# Patient Record
Sex: MALE | Employment: OTHER | URBAN - METROPOLITAN AREA
[De-identification: names, ages, dates, MRNs, and addresses within clinical notes are randomized per-mention and may not be internally consistent; named-entity substitution may affect disease eponyms.]

---

## 2023-01-01 ENCOUNTER — APPOINTMENT (OUTPATIENT)
Dept: CT IMAGING | Age: 38
DRG: 720 | End: 2023-01-01
Payer: COMMERCIAL

## 2023-01-01 ENCOUNTER — APPOINTMENT (OUTPATIENT)
Dept: GENERAL RADIOLOGY | Age: 38
DRG: 720 | End: 2023-01-01
Payer: COMMERCIAL

## 2023-01-01 ENCOUNTER — HOSPITAL ENCOUNTER (INPATIENT)
Age: 38
LOS: 3 days | DRG: 720 | End: 2023-08-18
Attending: EMERGENCY MEDICINE | Admitting: INTERNAL MEDICINE
Payer: COMMERCIAL

## 2023-01-01 VITALS
DIASTOLIC BLOOD PRESSURE: 54 MMHG | WEIGHT: 103.84 LBS | BODY MASS INDEX: 17.73 KG/M2 | SYSTOLIC BLOOD PRESSURE: 109 MMHG | OXYGEN SATURATION: 89 % | HEIGHT: 64 IN | TEMPERATURE: 96.1 F

## 2023-01-01 DIAGNOSIS — J96.01 SEPSIS WITH ACUTE HYPOXIC RESPIRATORY FAILURE AND SEPTIC SHOCK, DUE TO UNSPECIFIED ORGANISM (HCC): ICD-10-CM

## 2023-01-01 DIAGNOSIS — R65.21 SEPTIC SHOCK (HCC): ICD-10-CM

## 2023-01-01 DIAGNOSIS — J18.9 MULTIFOCAL PNEUMONIA: ICD-10-CM

## 2023-01-01 DIAGNOSIS — A41.9 SEPSIS WITH ACUTE HYPOXIC RESPIRATORY FAILURE AND SEPTIC SHOCK, DUE TO UNSPECIFIED ORGANISM (HCC): ICD-10-CM

## 2023-01-01 DIAGNOSIS — E87.29 RESPIRATORY ACIDOSIS: ICD-10-CM

## 2023-01-01 DIAGNOSIS — A41.9 SEPTIC SHOCK (HCC): ICD-10-CM

## 2023-01-01 DIAGNOSIS — J98.4 CAVITARY LESION OF LUNG: ICD-10-CM

## 2023-01-01 DIAGNOSIS — D64.9 ANEMIA, UNSPECIFIED TYPE: ICD-10-CM

## 2023-01-01 DIAGNOSIS — J96.01 ACUTE RESPIRATORY FAILURE WITH HYPOXIA AND HYPERCAPNIA (HCC): Primary | ICD-10-CM

## 2023-01-01 DIAGNOSIS — J96.02 ACUTE RESPIRATORY FAILURE WITH HYPOXIA AND HYPERCAPNIA (HCC): Primary | ICD-10-CM

## 2023-01-01 DIAGNOSIS — R65.21 SEPSIS WITH ACUTE HYPOXIC RESPIRATORY FAILURE AND SEPTIC SHOCK, DUE TO UNSPECIFIED ORGANISM (HCC): ICD-10-CM

## 2023-01-01 LAB
(1,3)-BETA-D-GLUCAN (FUNGITELL) INTERPRETATION: POSITIVE
1,3 BETA GLUCAN SER-MCNC: 95 PG/ML
ABO + RH BLD: NORMAL
ACID FAST STN SPEC QL: NORMAL
ALBUMIN SERPL-MCNC: 0.7 G/DL (ref 3.4–5)
ALBUMIN SERPL-MCNC: 1.3 G/DL (ref 3.4–5)
ALBUMIN SERPL-MCNC: 1.6 G/DL (ref 3.4–5)
ALP SERPL-CCNC: 37 U/L (ref 40–129)
ALP SERPL-CCNC: 56 U/L (ref 40–129)
ALP SERPL-CCNC: 64 U/L (ref 40–129)
ALT SERPL-CCNC: 179 U/L (ref 10–40)
ALT SERPL-CCNC: 21 U/L (ref 10–40)
ALT SERPL-CCNC: 95 U/L (ref 10–40)
AMPHETAMINES UR QL SCN>1000 NG/ML: ABNORMAL
ANION GAP SERPL CALCULATED.3IONS-SCNC: 11 MMOL/L (ref 3–16)
ANION GAP SERPL CALCULATED.3IONS-SCNC: 19 MMOL/L (ref 3–16)
ANION GAP SERPL CALCULATED.3IONS-SCNC: 19 MMOL/L (ref 3–16)
ANION GAP SERPL CALCULATED.3IONS-SCNC: 23 MMOL/L (ref 3–16)
ANION GAP SERPL CALCULATED.3IONS-SCNC: 25 MMOL/L (ref 3–16)
ANION GAP SERPL CALCULATED.3IONS-SCNC: 27 MMOL/L (ref 3–16)
ANION GAP SERPL CALCULATED.3IONS-SCNC: 27 MMOL/L (ref 3–16)
ANION GAP SERPL CALCULATED.3IONS-SCNC: 32 MMOL/L (ref 3–16)
ANISOCYTOSIS BLD QL SMEAR: ABNORMAL
APTT BLD: 48.2 SEC (ref 22.7–35.9)
APTT BLD: 48.8 SEC (ref 22.7–35.9)
APTT BLD: 60.7 SEC (ref 22.7–35.9)
AST SERPL-CCNC: 2430 U/L (ref 15–37)
AST SERPL-CCNC: 70 U/L (ref 15–37)
AST SERPL-CCNC: 934 U/L (ref 15–37)
BACTERIA URNS QL MICRO: ABNORMAL /HPF
BARBITURATES UR QL SCN>200 NG/ML: ABNORMAL
BASE EXCESS BLDA CALC-SCNC: -1 MMOL/L (ref -3–3)
BASE EXCESS BLDA CALC-SCNC: -11 MMOL/L (ref -3–3)
BASE EXCESS BLDA CALC-SCNC: -12.5 MMOL/L (ref -3–3)
BASE EXCESS BLDA CALC-SCNC: -15.3 MMOL/L (ref -3–3)
BASE EXCESS BLDA CALC-SCNC: -16 MMOL/L (ref -3–3)
BASE EXCESS BLDA CALC-SCNC: -16.6 MMOL/L (ref -3–3)
BASE EXCESS BLDA CALC-SCNC: -18.7 MMOL/L (ref -3–3)
BASE EXCESS BLDA CALC-SCNC: -22 MMOL/L (ref -3–3)
BASE EXCESS BLDA CALC-SCNC: -23 MMOL/L (ref -3–3)
BASE EXCESS BLDA CALC-SCNC: -23 MMOL/L (ref -3–3)
BASE EXCESS BLDA CALC-SCNC: -4 MMOL/L (ref -3–3)
BASE EXCESS BLDA CALC-SCNC: -7 MMOL/L (ref -3–3)
BASE EXCESS BLDA CALC-SCNC: -7 MMOL/L (ref -3–3)
BASE EXCESS BLDA CALC-SCNC: -7.4 MMOL/L (ref -3–3)
BASE EXCESS BLDV CALC-SCNC: -15.4 MMOL/L (ref -2–3)
BASE EXCESS BLDV CALC-SCNC: -22 MMOL/L (ref -3–3)
BASE EXCESS BLDV CALC-SCNC: -9.9 MMOL/L (ref -2–3)
BASOPHILS # BLD: 0 K/UL (ref 0–0.2)
BASOPHILS NFR BLD: 0 %
BENZODIAZ UR QL SCN>200 NG/ML: POSITIVE
BILIRUB DIRECT SERPL-MCNC: 0.8 MG/DL (ref 0–0.3)
BILIRUB DIRECT SERPL-MCNC: 1.9 MG/DL (ref 0–0.3)
BILIRUB DIRECT SERPL-MCNC: <0.2 MG/DL (ref 0–0.3)
BILIRUB INDIRECT SERPL-MCNC: 0 MG/DL (ref 0–1)
BILIRUB INDIRECT SERPL-MCNC: 0.2 MG/DL (ref 0–1)
BILIRUB INDIRECT SERPL-MCNC: ABNORMAL MG/DL (ref 0–1)
BILIRUB SERPL-MCNC: 0.6 MG/DL (ref 0–1)
BILIRUB SERPL-MCNC: 0.8 MG/DL (ref 0–1)
BILIRUB SERPL-MCNC: 2.1 MG/DL (ref 0–1)
BILIRUB UR QL STRIP.AUTO: ABNORMAL
BLASTS NFR BLD MANUAL: 3 %
BLASTS NFR BLD MANUAL: 4 %
BLASTS NFR BLD MANUAL: 6 %
BLASTS NFR BLD MANUAL: 6 %
BLD GP AB SCN SERPL QL: NORMAL
BLOOD BANK DISPENSE STATUS: NORMAL
BLOOD BANK PRODUCT CODE: NORMAL
BPU ID: NORMAL
BUN SERPL-MCNC: 18 MG/DL (ref 7–20)
BUN SERPL-MCNC: 21 MG/DL (ref 7–20)
BUN SERPL-MCNC: 22 MG/DL (ref 7–20)
BUN SERPL-MCNC: 24 MG/DL (ref 7–20)
BUN SERPL-MCNC: 24 MG/DL (ref 7–20)
BUN SERPL-MCNC: 25 MG/DL (ref 7–20)
BUN SERPL-MCNC: 26 MG/DL (ref 7–20)
BUN SERPL-MCNC: 27 MG/DL (ref 7–20)
CA-I BLD-SCNC: 0.74 MMOL/L (ref 1.12–1.32)
CA-I BLD-SCNC: 0.84 MMOL/L (ref 1.12–1.32)
CA-I BLD-SCNC: 0.87 MMOL/L (ref 1.12–1.32)
CA-I BLD-SCNC: 0.89 MMOL/L (ref 1.12–1.32)
CA-I BLD-SCNC: 0.91 MMOL/L (ref 1.12–1.32)
CA-I BLD-SCNC: 1.03 MMOL/L (ref 1.12–1.32)
CA-I BLD-SCNC: 1.22 MMOL/L (ref 1.12–1.32)
CA-I BLD-SCNC: 1.24 MMOL/L (ref 1.12–1.32)
CA-I BLD-SCNC: 1.34 MMOL/L (ref 1.12–1.32)
CA-I BLD-SCNC: 1.4 MMOL/L (ref 1.12–1.32)
CA-I BLD-SCNC: 1.42 MMOL/L (ref 1.12–1.32)
CALCIUM SERPL-MCNC: 5.5 MG/DL (ref 8.3–10.6)
CALCIUM SERPL-MCNC: 5.9 MG/DL (ref 8.3–10.6)
CALCIUM SERPL-MCNC: 6 MG/DL (ref 8.3–10.6)
CALCIUM SERPL-MCNC: 6.3 MG/DL (ref 8.3–10.6)
CALCIUM SERPL-MCNC: 6.3 MG/DL (ref 8.3–10.6)
CALCIUM SERPL-MCNC: 6.8 MG/DL (ref 8.3–10.6)
CALCIUM SERPL-MCNC: 8.9 MG/DL (ref 8.3–10.6)
CALCIUM SERPL-MCNC: 9 MG/DL (ref 8.3–10.6)
CANNABINOIDS UR QL SCN>50 NG/ML: ABNORMAL
CHLORIDE SERPL-SCNC: 90 MMOL/L (ref 99–110)
CHLORIDE SERPL-SCNC: 91 MMOL/L (ref 99–110)
CHLORIDE SERPL-SCNC: 93 MMOL/L (ref 99–110)
CHLORIDE SERPL-SCNC: 94 MMOL/L (ref 99–110)
CHLORIDE SERPL-SCNC: 95 MMOL/L (ref 99–110)
CHLORIDE SERPL-SCNC: 95 MMOL/L (ref 99–110)
CHLORIDE SERPL-SCNC: 97 MMOL/L (ref 99–110)
CHLORIDE SERPL-SCNC: 97 MMOL/L (ref 99–110)
CLARITY UR: CLEAR
CO2 BLDA-SCNC: 11 MMOL/L
CO2 BLDA-SCNC: 13 MMOL/L
CO2 BLDA-SCNC: 14 MMOL/L
CO2 BLDA-SCNC: 15 MMOL/L
CO2 BLDA-SCNC: 16 MMOL/L
CO2 BLDA-SCNC: 18 MMOL/L
CO2 BLDA-SCNC: 19 MMOL/L
CO2 BLDA-SCNC: 22 MMOL/L
CO2 BLDA-SCNC: 25 MMOL/L
CO2 BLDA-SCNC: 27 MMOL/L
CO2 BLDV-SCNC: 12 MMOL/L
CO2 BLDV-SCNC: 18 MMOL/L
CO2 BLDV-SCNC: 22 MMOL/L
CO2 SERPL-SCNC: 12 MMOL/L (ref 21–32)
CO2 SERPL-SCNC: 12 MMOL/L (ref 21–32)
CO2 SERPL-SCNC: 13 MMOL/L (ref 21–32)
CO2 SERPL-SCNC: 15 MMOL/L (ref 21–32)
CO2 SERPL-SCNC: 19 MMOL/L (ref 21–32)
CO2 SERPL-SCNC: 20 MMOL/L (ref 21–32)
CO2 SERPL-SCNC: 21 MMOL/L (ref 21–32)
CO2 SERPL-SCNC: 23 MMOL/L (ref 21–32)
COCAINE UR QL SCN: ABNORMAL
COHGB MFR BLDA: 0.6 % (ref 0–1.5)
COHGB MFR BLDA: 0.9 % (ref 0–1.5)
COHGB MFR BLDA: 0.9 % (ref 0–1.5)
COHGB MFR BLDA: 1 % (ref 0–1.5)
COHGB MFR BLDA: 1.2 % (ref 0–1.5)
COHGB MFR BLDA: 1.3 % (ref 0–1.5)
COHGB MFR BLDV: 1.1 % (ref 0–1.5)
COHGB MFR BLDV: 1.3 % (ref 0–1.5)
COLOR UR: YELLOW
CREAT SERPL-MCNC: 1.1 MG/DL (ref 0.9–1.3)
CREAT SERPL-MCNC: 1.2 MG/DL (ref 0.9–1.3)
CREAT SERPL-MCNC: 1.4 MG/DL (ref 0.9–1.3)
CREAT SERPL-MCNC: 1.6 MG/DL (ref 0.9–1.3)
CREAT SERPL-MCNC: 1.7 MG/DL (ref 0.9–1.3)
CREAT SERPL-MCNC: 1.9 MG/DL (ref 0.9–1.3)
D DIMER: 14.52 UG/ML FEU (ref 0–0.6)
D DIMER: 3.07 UG/ML FEU (ref 0–0.6)
D DIMER: 5.62 UG/ML FEU (ref 0–0.6)
DACRYOCYTES BLD QL SMEAR: ABNORMAL
DEPRECATED RDW RBC AUTO: 17.5 % (ref 12.4–15.4)
DEPRECATED RDW RBC AUTO: 17.8 % (ref 12.4–15.4)
DEPRECATED RDW RBC AUTO: 18.1 % (ref 12.4–15.4)
DEPRECATED RDW RBC AUTO: 18.3 % (ref 12.4–15.4)
DEPRECATED RDW RBC AUTO: 18.3 % (ref 12.4–15.4)
DEPRECATED RDW RBC AUTO: 18.9 % (ref 12.4–15.4)
DEPRECATED RDW RBC AUTO: 19.3 % (ref 12.4–15.4)
DEPRECATED RDW RBC AUTO: 19.3 % (ref 12.4–15.4)
DEPRECATED RDW RBC AUTO: 19.4 % (ref 12.4–15.4)
DEPRECATED RDW RBC AUTO: 20 % (ref 12.4–15.4)
DEPRECATED RDW RBC AUTO: 21.4 % (ref 12.4–15.4)
DESCRIPTION BLOOD BANK: NORMAL
DO-HGB MFR BLDV: 23.9 %
DO-HGB MFR BLDV: 64.8 %
DOHLE BOD BLD QL SMEAR: PRESENT
DRUG SCREEN COMMENT UR-IMP: ABNORMAL
EKG ATRIAL RATE: 128 BPM
EKG ATRIAL RATE: 153 BPM
EKG DIAGNOSIS: NORMAL
EKG DIAGNOSIS: NORMAL
EKG P AXIS: 74 DEGREES
EKG P AXIS: 76 DEGREES
EKG P-R INTERVAL: 162 MS
EKG P-R INTERVAL: 172 MS
EKG Q-T INTERVAL: 224 MS
EKG Q-T INTERVAL: 312 MS
EKG QRS DURATION: 80 MS
EKG QRS DURATION: 82 MS
EKG QTC CALCULATION (BAZETT): 357 MS
EKG QTC CALCULATION (BAZETT): 455 MS
EKG R AXIS: 61 DEGREES
EKG R AXIS: 73 DEGREES
EKG T AXIS: 69 DEGREES
EKG T AXIS: 70 DEGREES
EKG VENTRICULAR RATE: 128 BPM
EKG VENTRICULAR RATE: 153 BPM
EOSINOPHIL # BLD: 0 K/UL (ref 0–0.6)
EOSINOPHIL # BLD: 0.1 K/UL (ref 0–0.6)
EOSINOPHIL # BLD: 0.2 K/UL (ref 0–0.6)
EOSINOPHIL # BLD: 0.3 K/UL (ref 0–0.6)
EOSINOPHIL # BLD: 0.3 K/UL (ref 0–0.6)
EOSINOPHIL NFR BLD: 0 %
EOSINOPHIL NFR BLD: 1 %
EPI CELLS #/AREA URNS HPF: ABNORMAL /HPF (ref 0–5)
ETHANOLAMINE SERPL-MCNC: NORMAL MG/DL (ref 0–0.08)
FENTANYL SCREEN, URINE: ABNORMAL
FERRITIN SERPL IA-MCNC: 292.1 NG/ML (ref 30–400)
FIBRINOGEN PPP-MCNC: 131 MG/DL (ref 243–550)
FIBRINOGEN PPP-MCNC: 153 MG/DL (ref 243–550)
FIBRINOGEN PPP-MCNC: 164 MG/DL (ref 243–550)
FIBRINOGEN PPP-MCNC: 170 MG/DL (ref 243–550)
FIBRINOGEN PPP-MCNC: 219 MG/DL (ref 243–550)
FLUAV RNA RESP QL NAA+PROBE: NOT DETECTED
FLUBV RNA RESP QL NAA+PROBE: NOT DETECTED
FOLATE SERPL-MCNC: 3.09 NG/ML (ref 4.78–24.2)
GALACTOMANNAN AG SERPL IA-ACNC: 0.05
GALACTOMANNAN AG SERPL QL IA: NEGATIVE
GFR SERPLBLD CREATININE-BSD FMLA CKD-EPI: 46 ML/MIN/{1.73_M2}
GFR SERPLBLD CREATININE-BSD FMLA CKD-EPI: 53 ML/MIN/{1.73_M2}
GFR SERPLBLD CREATININE-BSD FMLA CKD-EPI: 57 ML/MIN/{1.73_M2}
GFR SERPLBLD CREATININE-BSD FMLA CKD-EPI: >60 ML/MIN/{1.73_M2}
GLUCOSE BLD-MCNC: 101 MG/DL (ref 70–99)
GLUCOSE BLD-MCNC: 109 MG/DL (ref 70–99)
GLUCOSE BLD-MCNC: 128 MG/DL (ref 70–99)
GLUCOSE BLD-MCNC: 142 MG/DL (ref 70–99)
GLUCOSE BLD-MCNC: 149 MG/DL (ref 70–99)
GLUCOSE BLD-MCNC: 149 MG/DL (ref 70–99)
GLUCOSE BLD-MCNC: 194 MG/DL (ref 70–99)
GLUCOSE BLD-MCNC: 216 MG/DL (ref 70–99)
GLUCOSE BLD-MCNC: 254 MG/DL (ref 70–99)
GLUCOSE BLD-MCNC: 27 MG/DL (ref 70–99)
GLUCOSE BLD-MCNC: 40 MG/DL (ref 70–99)
GLUCOSE BLD-MCNC: 44 MG/DL (ref 70–99)
GLUCOSE BLD-MCNC: 48 MG/DL (ref 70–99)
GLUCOSE BLD-MCNC: 50 MG/DL (ref 70–99)
GLUCOSE BLD-MCNC: 53 MG/DL (ref 70–99)
GLUCOSE BLD-MCNC: 56 MG/DL (ref 70–99)
GLUCOSE BLD-MCNC: 56 MG/DL (ref 70–99)
GLUCOSE BLD-MCNC: 58 MG/DL (ref 70–99)
GLUCOSE BLD-MCNC: 58 MG/DL (ref 70–99)
GLUCOSE BLD-MCNC: 62 MG/DL (ref 70–99)
GLUCOSE BLD-MCNC: 65 MG/DL (ref 70–99)
GLUCOSE BLD-MCNC: 73 MG/DL (ref 70–99)
GLUCOSE BLD-MCNC: 75 MG/DL (ref 70–99)
GLUCOSE BLD-MCNC: 79 MG/DL (ref 70–99)
GLUCOSE BLD-MCNC: 79 MG/DL (ref 70–99)
GLUCOSE BLD-MCNC: 87 MG/DL (ref 70–99)
GLUCOSE BLD-MCNC: 93 MG/DL (ref 70–99)
GLUCOSE BLD-MCNC: <10 MG/DL (ref 70–99)
GLUCOSE BLD-MCNC: <20 MG/DL (ref 70–99)
GLUCOSE SERPL-MCNC: 124 MG/DL (ref 70–99)
GLUCOSE SERPL-MCNC: 153 MG/DL (ref 70–99)
GLUCOSE SERPL-MCNC: 196 MG/DL (ref 70–99)
GLUCOSE SERPL-MCNC: 40 MG/DL (ref 70–99)
GLUCOSE SERPL-MCNC: 49 MG/DL (ref 70–99)
GLUCOSE SERPL-MCNC: 50 MG/DL (ref 70–99)
GLUCOSE SERPL-MCNC: 57 MG/DL (ref 70–99)
GLUCOSE SERPL-MCNC: 99 MG/DL (ref 70–99)
GLUCOSE UR STRIP.AUTO-MCNC: NEGATIVE MG/DL
GRAM STN SPEC: NORMAL
H CAPSUL AG SER IA-ACNC: NOT DETECTED
H CAPSUL AG SER QL IA: NOT DETECTED
H CAPSUL AG UR IA-ACNC: NOT DETECTED NG/ML
H CAPSUL AG UR QL IA: NOT DETECTED
HAPTOGLOB SERPL-MCNC: 112 MG/DL (ref 30–200)
HCO3 BLDA-SCNC: 12 MMOL/L (ref 21–29)
HCO3 BLDA-SCNC: 12 MMOL/L (ref 21–29)
HCO3 BLDA-SCNC: 13.2 MMOL/L (ref 21–29)
HCO3 BLDA-SCNC: 14 MMOL/L (ref 21–29)
HCO3 BLDA-SCNC: 16 MMOL/L (ref 21–29)
HCO3 BLDA-SCNC: 17 MMOL/L (ref 21–29)
HCO3 BLDA-SCNC: 20.2 MMOL/L (ref 21–29)
HCO3 BLDA-SCNC: 20.6 MMOL/L (ref 21–29)
HCO3 BLDA-SCNC: 21 MMOL/L (ref 21–29)
HCO3 BLDA-SCNC: 23.7 MMOL/L (ref 21–29)
HCO3 BLDA-SCNC: 25.1 MMOL/L (ref 21–29)
HCO3 BLDA-SCNC: 9.3 MMOL/L (ref 21–29)
HCO3 BLDA-SCNC: 9.6 MMOL/L (ref 21–29)
HCO3 BLDA-SCNC: 9.6 MMOL/L (ref 21–29)
HCO3 BLDV-SCNC: 10.5 MMOL/L (ref 23–29)
HCO3 BLDV-SCNC: 16.1 MMOL/L (ref 24–28)
HCO3 BLDV-SCNC: 20 MMOL/L (ref 24–28)
HCT VFR BLD AUTO: 13.8 % (ref 40.5–52.5)
HCT VFR BLD AUTO: 14.1 % (ref 40.5–52.5)
HCT VFR BLD AUTO: 15.7 % (ref 40.5–52.5)
HCT VFR BLD AUTO: 22.9 % (ref 40.5–52.5)
HCT VFR BLD AUTO: 23.1 % (ref 40.5–52.5)
HCT VFR BLD AUTO: 23.6 % (ref 40.5–52.5)
HCT VFR BLD AUTO: 24 % (ref 40.5–52.5)
HCT VFR BLD AUTO: 25.6 % (ref 40.5–52.5)
HCT VFR BLD AUTO: 25.8 % (ref 40.5–52.5)
HCT VFR BLD AUTO: 25.8 % (ref 40.5–52.5)
HCT VFR BLD AUTO: 26.1 % (ref 40.5–52.5)
HCT VFR BLD AUTO: 26.5 % (ref 40.5–52.5)
HGB BLD-MCNC: 3.7 G/DL (ref 13.5–17.5)
HGB BLD-MCNC: 3.8 G/DL (ref 13.5–17.5)
HGB BLD-MCNC: 4.6 G/DL (ref 13.5–17.5)
HGB BLD-MCNC: 7.1 G/DL (ref 13.5–17.5)
HGB BLD-MCNC: 7.2 G/DL (ref 13.5–17.5)
HGB BLD-MCNC: 7.3 G/DL (ref 13.5–17.5)
HGB BLD-MCNC: 7.6 G/DL (ref 13.5–17.5)
HGB BLD-MCNC: 7.8 G/DL (ref 13.5–17.5)
HGB BLD-MCNC: 8.2 G/DL (ref 13.5–17.5)
HGB BLD-MCNC: 8.4 G/DL (ref 13.5–17.5)
HGB BLD-MCNC: 8.5 G/DL (ref 13.5–17.5)
HGB BLD-MCNC: 8.6 G/DL (ref 13.5–17.5)
HGB BLDA-MCNC: 5.8 G/DL
HGB BLDA-MCNC: 7.2 G/DL
HGB BLDA-MCNC: 8.5 G/DL
HGB BLDA-MCNC: 9.1 G/DL
HGB BLDA-MCNC: 9.2 G/DL
HGB BLDA-MCNC: <5 G/DL
HGB UR QL STRIP.AUTO: NEGATIVE
HYPOCHROMIA BLD QL SMEAR: ABNORMAL
HYPOCHROMIA BLD QL SMEAR: ABNORMAL
IMMATURE RETIC FRACT: 0.61 (ref 0.21–0.37)
INR PPP: 1.81 (ref 0.84–1.16)
INR PPP: 1.97 (ref 0.84–1.16)
INR PPP: 2.44 (ref 0.84–1.16)
IRON SATN MFR SERPL: 18 % (ref 20–50)
IRON SERPL-MCNC: 30 UG/DL (ref 59–158)
KETONES UR STRIP.AUTO-MCNC: NEGATIVE MG/DL
LACTATE BLD-SCNC: 12.76 MMOL/L (ref 0.4–2)
LACTATE BLD-SCNC: 12.8 MMOL/L (ref 0.4–2)
LACTATE BLD-SCNC: 13.18 MMOL/L (ref 0.4–2)
LACTATE BLD-SCNC: 15.25 MMOL/L (ref 0.4–2)
LACTATE BLD-SCNC: 15.27 MMOL/L (ref 0.4–2)
LACTATE BLD-SCNC: 17.27 MMOL/L (ref 0.4–2)
LACTATE BLD-SCNC: 17.76 MMOL/L (ref 0.4–2)
LACTATE BLD-SCNC: 8.64 MMOL/L (ref 0.4–2)
LACTATE BLD-SCNC: >20 MMOL/L (ref 0.4–2)
LACTATE BLDV-SCNC: 10 MMOL/L (ref 0.4–1.9)
LACTATE BLDV-SCNC: 11.7 MMOL/L (ref 0.4–2)
LACTATE BLDV-SCNC: 11.8 MMOL/L (ref 0.4–2)
LACTATE BLDV-SCNC: 13.2 MMOL/L (ref 0.4–2)
LACTATE BLDV-SCNC: 14.5 MMOL/L (ref 0.4–1.9)
LACTATE BLDV-SCNC: 15 MMOL/L (ref 0.4–2)
LACTATE BLDV-SCNC: 17.9 MMOL/L (ref 0.4–2)
LACTATE BLDV-SCNC: 19.6 MMOL/L (ref 0.4–2)
LACTATE BLDV-SCNC: 23.5 MMOL/L (ref 0.4–2)
LACTATE BLDV-SCNC: 24.7 MMOL/L (ref 0.4–2)
LACTATE BLDV-SCNC: 7.2 MMOL/L (ref 0.4–2)
LEGIONELLA AG UR QL: NORMAL
LEUKOCYTE ESTERASE UR QL STRIP.AUTO: NEGATIVE
LIPASE SERPL-CCNC: 29 U/L (ref 13–60)
LIPASE SERPL-CCNC: 374 U/L (ref 13–60)
LYMPHOCYTES # BLD: 0.2 K/UL (ref 1–5.1)
LYMPHOCYTES # BLD: 0.9 K/UL (ref 1–5.1)
LYMPHOCYTES # BLD: 1 K/UL (ref 1–5.1)
LYMPHOCYTES # BLD: 1.2 K/UL (ref 1–5.1)
LYMPHOCYTES # BLD: 1.3 K/UL (ref 1–5.1)
LYMPHOCYTES # BLD: 1.3 K/UL (ref 1–5.1)
LYMPHOCYTES # BLD: 1.6 K/UL (ref 1–5.1)
LYMPHOCYTES # BLD: 2.8 K/UL (ref 1–5.1)
LYMPHOCYTES # BLD: 2.9 K/UL (ref 1–5.1)
LYMPHOCYTES # BLD: 3 K/UL (ref 1–5.1)
LYMPHOCYTES # BLD: 3.5 K/UL (ref 1–5.1)
LYMPHOCYTES NFR BLD: 1 %
LYMPHOCYTES NFR BLD: 10 %
LYMPHOCYTES NFR BLD: 12 %
LYMPHOCYTES NFR BLD: 12 %
LYMPHOCYTES NFR BLD: 15 %
LYMPHOCYTES NFR BLD: 27 %
LYMPHOCYTES NFR BLD: 4 %
LYMPHOCYTES NFR BLD: 4 %
LYMPHOCYTES NFR BLD: 5 %
LYMPHOCYTES NFR BLD: 6 %
LYMPHOCYTES NFR BLD: 9 %
MACROCYTES BLD QL SMEAR: ABNORMAL
MAGNESIUM SERPL-MCNC: 1.8 MG/DL (ref 1.8–2.4)
MAGNESIUM SERPL-MCNC: 1.9 MG/DL (ref 1.8–2.4)
MAGNESIUM SERPL-MCNC: 2.2 MG/DL (ref 1.8–2.4)
MCH RBC QN AUTO: 29.3 PG (ref 26–34)
MCH RBC QN AUTO: 29.5 PG (ref 26–34)
MCH RBC QN AUTO: 29.5 PG (ref 26–34)
MCH RBC QN AUTO: 29.7 PG (ref 26–34)
MCH RBC QN AUTO: 29.7 PG (ref 26–34)
MCH RBC QN AUTO: 29.8 PG (ref 26–34)
MCH RBC QN AUTO: 29.8 PG (ref 26–34)
MCH RBC QN AUTO: 32.6 PG (ref 26–34)
MCH RBC QN AUTO: 32.7 PG (ref 26–34)
MCH RBC QN AUTO: 32.8 PG (ref 26–34)
MCH RBC QN AUTO: 33 PG (ref 26–34)
MCHC RBC AUTO-ENTMCNC: 26.7 G/DL (ref 31–36)
MCHC RBC AUTO-ENTMCNC: 27.1 G/DL (ref 31–36)
MCHC RBC AUTO-ENTMCNC: 29 G/DL (ref 31–36)
MCHC RBC AUTO-ENTMCNC: 29.5 G/DL (ref 31–36)
MCHC RBC AUTO-ENTMCNC: 30.8 G/DL (ref 31–36)
MCHC RBC AUTO-ENTMCNC: 31.4 G/DL (ref 31–36)
MCHC RBC AUTO-ENTMCNC: 31.7 G/DL (ref 31–36)
MCHC RBC AUTO-ENTMCNC: 32.5 G/DL (ref 31–36)
MCHC RBC AUTO-ENTMCNC: 32.6 G/DL (ref 31–36)
MCHC RBC AUTO-ENTMCNC: 32.8 G/DL (ref 31–36)
MCHC RBC AUTO-ENTMCNC: 33.2 G/DL (ref 31–36)
MCV RBC AUTO: 101.1 FL (ref 80–100)
MCV RBC AUTO: 106.8 FL (ref 80–100)
MCV RBC AUTO: 113.4 FL (ref 80–100)
MCV RBC AUTO: 120.7 FL (ref 80–100)
MCV RBC AUTO: 122.2 FL (ref 80–100)
MCV RBC AUTO: 88.9 FL (ref 80–100)
MCV RBC AUTO: 90.1 FL (ref 80–100)
MCV RBC AUTO: 90.9 FL (ref 80–100)
MCV RBC AUTO: 91.1 FL (ref 80–100)
MCV RBC AUTO: 93.7 FL (ref 80–100)
MCV RBC AUTO: 94 FL (ref 80–100)
METAMYELOCYTES NFR BLD MANUAL: 1 %
METAMYELOCYTES NFR BLD MANUAL: 2 %
METAMYELOCYTES NFR BLD MANUAL: 2 %
METAMYELOCYTES NFR BLD MANUAL: 8 %
METHADONE UR QL SCN>300 NG/ML: ABNORMAL
METHGB MFR BLDA: 0.3 % (ref 0–1.4)
METHGB MFR BLDA: 0.5 % (ref 0–1.4)
METHGB MFR BLDA: 0.9 % (ref 0–1.4)
METHGB MFR BLDA: 1.1 % (ref 0–1.4)
METHGB MFR BLDV: 0.3 % (ref 0–1.5)
METHGB MFR BLDV: 0.6 % (ref 0–1.5)
MICROCYTES BLD QL SMEAR: ABNORMAL
MONOCYTES # BLD: 0.2 K/UL (ref 0–1.3)
MONOCYTES # BLD: 0.3 K/UL (ref 0–1.3)
MONOCYTES # BLD: 0.4 K/UL (ref 0–1.3)
MONOCYTES # BLD: 0.5 K/UL (ref 0–1.3)
MONOCYTES # BLD: 0.6 K/UL (ref 0–1.3)
MONOCYTES # BLD: 0.7 K/UL (ref 0–1.3)
MONOCYTES # BLD: 4.3 K/UL (ref 0–1.3)
MONOCYTES # BLD: 4.4 K/UL (ref 0–1.3)
MONOCYTES # BLD: 5.7 K/UL (ref 0–1.3)
MONOCYTES # BLD: 6.5 K/UL (ref 0–1.3)
MONOCYTES # BLD: 6.7 K/UL (ref 0–1.3)
MONOCYTES NFR BLD: 1 %
MONOCYTES NFR BLD: 18 %
MONOCYTES NFR BLD: 18 %
MONOCYTES NFR BLD: 2 %
MONOCYTES NFR BLD: 2 %
MONOCYTES NFR BLD: 20 %
MONOCYTES NFR BLD: 27 %
MONOCYTES NFR BLD: 3 %
MONOCYTES NFR BLD: 3 %
MONOCYTES NFR BLD: 4 %
MONOCYTES NFR BLD: 43 %
MRSA DNA SPEC QL NAA+PROBE: NORMAL
MTB COMPLEX PCR: NORMAL
MYELOCYTES NFR BLD MANUAL: 1 %
MYELOCYTES NFR BLD MANUAL: 2 %
MYELOCYTES NFR BLD MANUAL: 2 %
MYELOCYTES NFR BLD MANUAL: 3 %
MYELOCYTES NFR BLD MANUAL: 3 %
MYELOCYTES NFR BLD MANUAL: 4 %
MYELOCYTES NFR BLD MANUAL: 5 %
NEUTROPHILS # BLD: 13.7 K/UL (ref 1.7–7.7)
NEUTROPHILS # BLD: 15.6 K/UL (ref 1.7–7.7)
NEUTROPHILS # BLD: 16.1 K/UL (ref 1.7–7.7)
NEUTROPHILS # BLD: 16.3 K/UL (ref 1.7–7.7)
NEUTROPHILS # BLD: 16.8 K/UL (ref 1.7–7.7)
NEUTROPHILS # BLD: 19.4 K/UL (ref 1.7–7.7)
NEUTROPHILS # BLD: 19.9 K/UL (ref 1.7–7.7)
NEUTROPHILS # BLD: 22.4 K/UL (ref 1.7–7.7)
NEUTROPHILS # BLD: 22.5 K/UL (ref 1.7–7.7)
NEUTROPHILS # BLD: 4.8 K/UL (ref 1.7–7.7)
NEUTROPHILS # BLD: 8.8 K/UL (ref 1.7–7.7)
NEUTROPHILS NFR BLD: 28 %
NEUTROPHILS NFR BLD: 38 %
NEUTROPHILS NFR BLD: 44 %
NEUTROPHILS NFR BLD: 46 %
NEUTROPHILS NFR BLD: 53 %
NEUTROPHILS NFR BLD: 53 %
NEUTROPHILS NFR BLD: 55 %
NEUTROPHILS NFR BLD: 59 %
NEUTROPHILS NFR BLD: 64 %
NEUTROPHILS NFR BLD: 66 %
NEUTROPHILS NFR BLD: 68 %
NEUTS BAND NFR BLD MANUAL: 10 % (ref 0–7)
NEUTS BAND NFR BLD MANUAL: 12 % (ref 0–7)
NEUTS BAND NFR BLD MANUAL: 12 % (ref 0–7)
NEUTS BAND NFR BLD MANUAL: 24 % (ref 0–7)
NEUTS BAND NFR BLD MANUAL: 26 % (ref 0–7)
NEUTS BAND NFR BLD MANUAL: 28 % (ref 0–7)
NEUTS BAND NFR BLD MANUAL: 30 % (ref 0–7)
NEUTS BAND NFR BLD MANUAL: 30 % (ref 0–7)
NEUTS BAND NFR BLD MANUAL: 37 % (ref 0–7)
NEUTS BAND NFR BLD MANUAL: 5 % (ref 0–7)
NEUTS BAND NFR BLD MANUAL: 6 % (ref 0–7)
NITRITE UR QL STRIP.AUTO: NEGATIVE
NRBC BLD-RTO: 12 /100 WBC
NRBC BLD-RTO: 19 /100 WBC
NRBC BLD-RTO: 43 /100 WBC
NRBC BLD-RTO: 7 /100 WBC
NRBC BLD-RTO: 7 /100 WBC
NT-PROBNP SERPL-MCNC: 3399 PG/ML (ref 0–124)
OPIATES UR QL SCN>300 NG/ML: ABNORMAL
ORGANISM: ABNORMAL
OSMOLALITY SERPL: 285 MOSM/KG (ref 278–305)
OSMOLALITY UR: 298 MOSM/KG (ref 390–1070)
OSMOLALITY UR: 341 MOSM/KG (ref 390–1070)
OVALOCYTES BLD QL SMEAR: ABNORMAL
OXYCODONE UR QL SCN: ABNORMAL
PATH INTERP BLD-IMP: NO
PATH INTERP BLD-IMP: NORMAL
PATH INTERP BLD-IMP: YES
PCO2 BLDA: 42.3 MM HG (ref 35–45)
PCO2 BLDA: 42.6 MM HG (ref 35–45)
PCO2 BLDA: 43.6 MM HG (ref 35–45)
PCO2 BLDA: 44.9 MMHG (ref 35–45)
PCO2 BLDA: 46.4 MM HG (ref 35–45)
PCO2 BLDA: 46.5 MM HG (ref 35–45)
PCO2 BLDA: 48.4 MM HG (ref 35–45)
PCO2 BLDA: 49.2 MM HG (ref 35–45)
PCO2 BLDA: 49.3 MMHG (ref 35–45)
PCO2 BLDA: 49.4 MMHG (ref 35–45)
PCO2 BLDA: 50.8 MMHG (ref 35–45)
PCO2 BLDA: 51.9 MMHG (ref 35–45)
PCO2 BLDA: 53.2 MM HG (ref 35–45)
PCO2 BLDA: 54 MMHG (ref 35–45)
PCO2 BLDV: 49.2 MM HG (ref 40–50)
PCO2 BLDV: 71.8 MMHG (ref 41–51)
PCO2 BLDV: 73.5 MMHG (ref 41–51)
PCP UR QL SCN>25 NG/ML: ABNORMAL
PERFORMED ON: ABNORMAL
PERFORMED ON: NORMAL
PH BLDA: 6.92 [PH] (ref 7.35–7.45)
PH BLDA: 6.94 [PH] (ref 7.35–7.45)
PH BLDA: 6.96 [PH] (ref 7.35–7.45)
PH BLDA: 6.97 [PH] (ref 7.35–7.45)
PH BLDA: 7.04 [PH] (ref 7.35–7.45)
PH BLDA: 7.06 [PH] (ref 7.35–7.45)
PH BLDA: 7.1 [PH] (ref 7.35–7.45)
PH BLDA: 7.12 [PH] (ref 7.35–7.45)
PH BLDA: 7.15 [PH] (ref 7.35–7.45)
PH BLDA: 7.19 [PH] (ref 7.35–7.45)
PH BLDA: 7.24 [PH] (ref 7.35–7.45)
PH BLDA: 7.25 [PH] (ref 7.35–7.45)
PH BLDA: 7.26 [PH] (ref 7.35–7.45)
PH BLDA: 7.32 [PH] (ref 7.35–7.45)
PH BLDV: 6.94 [PH] (ref 7.35–7.45)
PH BLDV: 6.95 [PH] (ref 7.35–7.45)
PH BLDV: 7.05 [PH] (ref 7.35–7.45)
PH UR STRIP.AUTO: 6 [PH] (ref 5–8)
PH UR STRIP: 5 [PH]
PH VENOUS: 7.13 (ref 7.35–7.45)
PH VENOUS: 7.29 (ref 7.35–7.45)
PLATELET # BLD AUTO: 13 K/UL (ref 135–450)
PLATELET # BLD AUTO: 14 K/UL (ref 135–450)
PLATELET # BLD AUTO: 28 K/UL (ref 135–450)
PLATELET # BLD AUTO: 32 K/UL (ref 135–450)
PLATELET # BLD AUTO: 4 K/UL (ref 135–450)
PLATELET # BLD AUTO: 46 K/UL (ref 135–450)
PLATELET # BLD AUTO: 46 K/UL (ref 135–450)
PLATELET # BLD AUTO: 6 K/UL (ref 135–450)
PLATELET # BLD AUTO: 60 K/UL (ref 135–450)
PLATELET # BLD AUTO: 65 K/UL (ref 135–450)
PLATELET # BLD AUTO: 9 K/UL (ref 135–450)
PLATELET BLD QL SMEAR: ABNORMAL
PMV BLD AUTO: 10.1 FL (ref 5–10.5)
PMV BLD AUTO: 11.3 FL (ref 5–10.5)
PMV BLD AUTO: 7 FL (ref 5–10.5)
PMV BLD AUTO: 7.4 FL (ref 5–10.5)
PMV BLD AUTO: 7.6 FL (ref 5–10.5)
PMV BLD AUTO: 7.8 FL (ref 5–10.5)
PMV BLD AUTO: 8.3 FL (ref 5–10.5)
PMV BLD AUTO: 8.5 FL (ref 5–10.5)
PMV BLD AUTO: 8.8 FL (ref 5–10.5)
PMV BLD AUTO: 9.3 FL (ref 5–10.5)
PMV BLD AUTO: 9.9 FL (ref 5–10.5)
PNEUMONIA PANEL MOLECULAR: ABNORMAL
PO2 BLDA: 111.5 MM HG (ref 75–108)
PO2 BLDA: 111.7 MM HG (ref 75–108)
PO2 BLDA: 115 MMHG (ref 75–108)
PO2 BLDA: 143 MMHG (ref 75–108)
PO2 BLDA: 146.4 MM HG (ref 75–108)
PO2 BLDA: 149 MMHG (ref 75–108)
PO2 BLDA: 60.6 MMHG (ref 75–108)
PO2 BLDA: 67 MM HG (ref 75–108)
PO2 BLDA: 71.3 MM HG (ref 75–108)
PO2 BLDA: 74.2 MMHG (ref 75–108)
PO2 BLDA: 75 MM HG (ref 75–108)
PO2 BLDA: 87.3 MMHG (ref 75–108)
PO2 BLDA: 91.5 MM HG (ref 75–108)
PO2 BLDA: 96.1 MM HG (ref 75–108)
PO2 BLDV: 36.1 MMHG (ref 25–40)
PO2 BLDV: 45 MM HG
PO2 BLDV: 71.7 MMHG (ref 25–40)
POC SAMPLE TYPE: ABNORMAL
POIKILOCYTOSIS BLD QL SMEAR: ABNORMAL
POIKILOCYTOSIS BLD QL SMEAR: ABNORMAL
POLYCHROMASIA BLD QL SMEAR: ABNORMAL
POTASSIUM BLD-SCNC: 4.1 MMOL/L (ref 3.5–5.1)
POTASSIUM BLD-SCNC: 4.7 MMOL/L (ref 3.5–5.1)
POTASSIUM BLD-SCNC: 5 MMOL/L (ref 3.5–5.1)
POTASSIUM BLD-SCNC: 5.2 MMOL/L (ref 3.5–5.1)
POTASSIUM BLD-SCNC: 5.3 MMOL/L (ref 3.5–5.1)
POTASSIUM BLD-SCNC: 5.4 MMOL/L (ref 3.5–5.1)
POTASSIUM BLD-SCNC: 5.5 MMOL/L (ref 3.5–5.1)
POTASSIUM BLD-SCNC: 5.8 MMOL/L (ref 3.5–5.1)
POTASSIUM SERPL-SCNC: 3.6 MMOL/L (ref 3.5–5.1)
POTASSIUM SERPL-SCNC: 5 MMOL/L (ref 3.5–5.1)
POTASSIUM SERPL-SCNC: 5.2 MMOL/L (ref 3.5–5.1)
POTASSIUM SERPL-SCNC: 5.6 MMOL/L (ref 3.5–5.1)
POTASSIUM SERPL-SCNC: 5.9 MMOL/L (ref 3.5–5.1)
POTASSIUM SERPL-SCNC: 5.9 MMOL/L (ref 3.5–5.1)
POTASSIUM SERPL-SCNC: 6.3 MMOL/L (ref 3.5–5.1)
POTASSIUM SERPL-SCNC: 6.7 MMOL/L (ref 3.5–5.1)
PROCALCITONIN SERPL IA-MCNC: 44.55 NG/ML (ref 0–0.15)
PROCALCITONIN SERPL IA-MCNC: 51.6 NG/ML (ref 0–0.15)
PROT SERPL-MCNC: 3.1 G/DL (ref 6.4–8.2)
PROT SERPL-MCNC: 4.1 G/DL (ref 6.4–8.2)
PROT SERPL-MCNC: 5.8 G/DL (ref 6.4–8.2)
PROT UR STRIP.AUTO-MCNC: ABNORMAL MG/DL
PROTHROMBIN TIME: 20.9 SEC (ref 11.5–14.8)
PROTHROMBIN TIME: 22.3 SEC (ref 11.5–14.8)
PROTHROMBIN TIME: 26.3 SEC (ref 11.5–14.8)
RBC # BLD AUTO: 1.13 M/UL (ref 4.2–5.9)
RBC # BLD AUTO: 1.17 M/UL (ref 4.2–5.9)
RBC # BLD AUTO: 1.55 M/UL (ref 4.2–5.9)
RBC # BLD AUTO: 2.17 M/UL (ref 4.2–5.9)
RBC # BLD AUTO: 2.3 M/UL (ref 4.2–5.9)
RBC # BLD AUTO: 2.43 M/UL (ref 4.2–5.9)
RBC # BLD AUTO: 2.6 M/UL (ref 4.2–5.9)
RBC # BLD AUTO: 2.76 M/UL (ref 4.2–5.9)
RBC # BLD AUTO: 2.86 M/UL (ref 4.2–5.9)
RBC # BLD AUTO: 2.88 M/UL (ref 4.2–5.9)
RBC # BLD AUTO: 2.91 M/UL (ref 4.2–5.9)
RBC #/AREA URNS HPF: ABNORMAL /HPF (ref 0–4)
RBC MORPH BLD: NORMAL
RBC MORPH BLD: NORMAL
REASON FOR REJECTION: NORMAL
REJECTED TEST: NORMAL
REPORT: NORMAL
REPORT: NORMAL
RETICS/RBC NFR AUTO: 3.34 % (ref 0.5–2.18)
SAO2 % BLDA: 87 % (ref 93–100)
SAO2 % BLDA: 89 % (ref 93–100)
SAO2 % BLDA: 92 % (ref 93–100)
SAO2 % BLDA: 92 % (ref 93–100)
SAO2 % BLDA: 93 % (ref 93–100)
SAO2 % BLDA: 93 % (ref 93–100)
SAO2 % BLDA: 94 % (ref 93–100)
SAO2 % BLDA: 94 % (ref 93–100)
SAO2 % BLDA: 95 % (ref 93–100)
SAO2 % BLDA: 96 % (ref 93–100)
SAO2 % BLDA: 97 % (ref 93–100)
SAO2 % BLDA: 98 % (ref 93–100)
SAO2 % BLDA: 99 % (ref 93–100)
SAO2 % BLDA: 99 % (ref 93–100)
SAO2 % BLDV: 34 %
SAO2 % BLDV: 53 %
SAO2 % BLDV: 76 %
SARS-COV-2 RNA RESP QL NAA+PROBE: NOT DETECTED
SCHISTOCYTES BLD QL SMEAR: ABNORMAL
SLIDE REVIEW: ABNORMAL
SODIUM BLD-SCNC: 127 MMOL/L (ref 136–145)
SODIUM BLD-SCNC: 128 MMOL/L (ref 136–145)
SODIUM BLD-SCNC: 130 MMOL/L (ref 136–145)
SODIUM BLD-SCNC: 131 MMOL/L (ref 136–145)
SODIUM BLD-SCNC: 132 MMOL/L (ref 136–145)
SODIUM BLD-SCNC: 133 MMOL/L (ref 136–145)
SODIUM BLD-SCNC: 137 MMOL/L (ref 136–145)
SODIUM BLD-SCNC: 138 MMOL/L (ref 136–145)
SODIUM SERPL-SCNC: 123 MMOL/L (ref 136–145)
SODIUM SERPL-SCNC: 125 MMOL/L (ref 136–145)
SODIUM SERPL-SCNC: 133 MMOL/L (ref 136–145)
SODIUM SERPL-SCNC: 134 MMOL/L (ref 136–145)
SODIUM SERPL-SCNC: 136 MMOL/L (ref 136–145)
SODIUM SERPL-SCNC: 137 MMOL/L (ref 136–145)
SODIUM SERPL-SCNC: 141 MMOL/L (ref 136–145)
SODIUM SERPL-SCNC: 141 MMOL/L (ref 136–145)
SODIUM UR-SCNC: <20 MMOL/L
SP GR UR STRIP.AUTO: 1.02 (ref 1–1.03)
TIBC SERPL-MCNC: 168 UG/DL (ref 260–445)
TOXIC GRANULES BLD QL SMEAR: PRESENT
TRIGL SERPL-MCNC: 67 MG/DL (ref 0–150)
TROPONIN, HIGH SENSITIVITY: 60 NG/L (ref 0–22)
TROPONIN, HIGH SENSITIVITY: 65 NG/L (ref 0–22)
UA COMPLETE W REFLEX CULTURE PNL UR: ABNORMAL
UA DIPSTICK W REFLEX MICRO PNL UR: YES
URN SPEC COLLECT METH UR: ABNORMAL
UROBILINOGEN UR STRIP-ACNC: 1 E.U./DL
VANCOMYCIN SERPL-MCNC: 20.8 UG/ML
VANCOMYCIN SERPL-MCNC: 21 UG/ML
VIT B12 SERPL-MCNC: >2000 PG/ML (ref 211–911)
WBC # BLD AUTO: 12.8 K/UL (ref 4–11)
WBC # BLD AUTO: 14.8 K/UL (ref 4–11)
WBC # BLD AUTO: 16.1 K/UL (ref 4–11)
WBC # BLD AUTO: 17 K/UL (ref 4–11)
WBC # BLD AUTO: 19.9 K/UL (ref 4–11)
WBC # BLD AUTO: 20 K/UL (ref 4–11)
WBC # BLD AUTO: 23.7 K/UL (ref 4–11)
WBC # BLD AUTO: 24 K/UL (ref 4–11)
WBC # BLD AUTO: 24.7 K/UL (ref 4–11)
WBC # BLD AUTO: 31.7 K/UL (ref 4–11)
WBC # BLD AUTO: 32.4 K/UL (ref 4–11)
WBC #/AREA URNS HPF: ABNORMAL /HPF (ref 0–5)

## 2023-01-01 PROCEDURE — 99255 IP/OBS CONSLTJ NEW/EST HI 80: CPT | Performed by: INTERNAL MEDICINE

## 2023-01-01 PROCEDURE — 2500000003 HC RX 250 WO HCPCS

## 2023-01-01 PROCEDURE — 30233K1 TRANSFUSION OF NONAUTOLOGOUS FROZEN PLASMA INTO PERIPHERAL VEIN, PERCUTANEOUS APPROACH: ICD-10-PCS | Performed by: INTERNAL MEDICINE

## 2023-01-01 PROCEDURE — 2700000000 HC OXYGEN THERAPY PER DAY

## 2023-01-01 PROCEDURE — 71260 CT THORAX DX C+: CPT

## 2023-01-01 PROCEDURE — 83735 ASSAY OF MAGNESIUM: CPT

## 2023-01-01 PROCEDURE — 84484 ASSAY OF TROPONIN QUANT: CPT

## 2023-01-01 PROCEDURE — 99291 CRITICAL CARE FIRST HOUR: CPT | Performed by: INTERNAL MEDICINE

## 2023-01-01 PROCEDURE — 83605 ASSAY OF LACTIC ACID: CPT

## 2023-01-01 PROCEDURE — 94761 N-INVAS EAR/PLS OXIMETRY MLT: CPT

## 2023-01-01 PROCEDURE — 6360000002 HC RX W HCPCS

## 2023-01-01 PROCEDURE — 87070 CULTURE OTHR SPECIMN AEROBIC: CPT

## 2023-01-01 PROCEDURE — 87206 SMEAR FLUORESCENT/ACID STAI: CPT

## 2023-01-01 PROCEDURE — 80048 BASIC METABOLIC PNL TOTAL CA: CPT

## 2023-01-01 PROCEDURE — 94003 VENT MGMT INPAT SUBQ DAY: CPT

## 2023-01-01 PROCEDURE — 85379 FIBRIN DEGRADATION QUANT: CPT

## 2023-01-01 PROCEDURE — 2580000003 HC RX 258

## 2023-01-01 PROCEDURE — C9113 INJ PANTOPRAZOLE SODIUM, VIA: HCPCS

## 2023-01-01 PROCEDURE — 85384 FIBRINOGEN ACTIVITY: CPT

## 2023-01-01 PROCEDURE — 80202 ASSAY OF VANCOMYCIN: CPT

## 2023-01-01 PROCEDURE — 96367 TX/PROPH/DG ADDL SEQ IV INF: CPT

## 2023-01-01 PROCEDURE — 84478 ASSAY OF TRIGLYCERIDES: CPT

## 2023-01-01 PROCEDURE — 82746 ASSAY OF FOLIC ACID SERUM: CPT

## 2023-01-01 PROCEDURE — 82947 ASSAY GLUCOSE BLOOD QUANT: CPT

## 2023-01-01 PROCEDURE — 87449 NOS EACH ORGANISM AG IA: CPT

## 2023-01-01 PROCEDURE — 94002 VENT MGMT INPAT INIT DAY: CPT

## 2023-01-01 PROCEDURE — 83690 ASSAY OF LIPASE: CPT

## 2023-01-01 PROCEDURE — 36415 COLL VENOUS BLD VENIPUNCTURE: CPT

## 2023-01-01 PROCEDURE — 84295 ASSAY OF SERUM SODIUM: CPT

## 2023-01-01 PROCEDURE — 02HV33Z INSERTION OF INFUSION DEVICE INTO SUPERIOR VENA CAVA, PERCUTANEOUS APPROACH: ICD-10-PCS

## 2023-01-01 PROCEDURE — 99233 SBSQ HOSP IP/OBS HIGH 50: CPT | Performed by: INTERNAL MEDICINE

## 2023-01-01 PROCEDURE — 6370000000 HC RX 637 (ALT 250 FOR IP): Performed by: INTERNAL MEDICINE

## 2023-01-01 PROCEDURE — 84132 ASSAY OF SERUM POTASSIUM: CPT

## 2023-01-01 PROCEDURE — 82803 BLOOD GASES ANY COMBINATION: CPT

## 2023-01-01 PROCEDURE — 36620 INSERTION CATHETER ARTERY: CPT

## 2023-01-01 PROCEDURE — 85610 PROTHROMBIN TIME: CPT

## 2023-01-01 PROCEDURE — 83935 ASSAY OF URINE OSMOLALITY: CPT

## 2023-01-01 PROCEDURE — 2500000003 HC RX 250 WO HCPCS: Performed by: EMERGENCY MEDICINE

## 2023-01-01 PROCEDURE — 2000000000 HC ICU R&B

## 2023-01-01 PROCEDURE — 94644 CONT INHLJ TX 1ST HOUR: CPT

## 2023-01-01 PROCEDURE — 85730 THROMBOPLASTIN TIME PARTIAL: CPT

## 2023-01-01 PROCEDURE — 85014 HEMATOCRIT: CPT

## 2023-01-01 PROCEDURE — 36430 TRANSFUSION BLD/BLD COMPNT: CPT

## 2023-01-01 PROCEDURE — 96365 THER/PROPH/DIAG IV INF INIT: CPT

## 2023-01-01 PROCEDURE — 2580000003 HC RX 258: Performed by: INTERNAL MEDICINE

## 2023-01-01 PROCEDURE — 6360000002 HC RX W HCPCS: Performed by: EMERGENCY MEDICINE

## 2023-01-01 PROCEDURE — P9016 RBC LEUKOCYTES REDUCED: HCPCS

## 2023-01-01 PROCEDURE — 87385 HISTOPLASMA CAPSUL AG IA: CPT

## 2023-01-01 PROCEDURE — 87641 MR-STAPH DNA AMP PROBE: CPT

## 2023-01-01 PROCEDURE — 87116 MYCOBACTERIA CULTURE: CPT

## 2023-01-01 PROCEDURE — 03HY32Z INSERTION OF MONITORING DEVICE INTO UPPER ARTERY, PERCUTANEOUS APPROACH: ICD-10-PCS

## 2023-01-01 PROCEDURE — 84300 ASSAY OF URINE SODIUM: CPT

## 2023-01-01 PROCEDURE — 85025 COMPLETE CBC W/AUTO DIFF WBC: CPT

## 2023-01-01 PROCEDURE — 87186 SC STD MICRODIL/AGAR DIL: CPT

## 2023-01-01 PROCEDURE — 83010 ASSAY OF HAPTOGLOBIN QUANT: CPT

## 2023-01-01 PROCEDURE — 87040 BLOOD CULTURE FOR BACTERIA: CPT

## 2023-01-01 PROCEDURE — 2580000003 HC RX 258: Performed by: EMERGENCY MEDICINE

## 2023-01-01 PROCEDURE — 70450 CT HEAD/BRAIN W/O DYE: CPT

## 2023-01-01 PROCEDURE — 86920 COMPATIBILITY TEST SPIN: CPT

## 2023-01-01 PROCEDURE — 84145 PROCALCITONIN (PCT): CPT

## 2023-01-01 PROCEDURE — 80076 HEPATIC FUNCTION PANEL: CPT

## 2023-01-01 PROCEDURE — 6360000004 HC RX CONTRAST MEDICATION: Performed by: INTERNAL MEDICINE

## 2023-01-01 PROCEDURE — 81001 URINALYSIS AUTO W/SCOPE: CPT

## 2023-01-01 PROCEDURE — 82330 ASSAY OF CALCIUM: CPT

## 2023-01-01 PROCEDURE — 6360000002 HC RX W HCPCS: Performed by: NURSE PRACTITIONER

## 2023-01-01 PROCEDURE — 71045 X-RAY EXAM CHEST 1 VIEW: CPT

## 2023-01-01 PROCEDURE — 96368 THER/DIAG CONCURRENT INF: CPT

## 2023-01-01 PROCEDURE — 87150 DNA/RNA AMPLIFIED PROBE: CPT

## 2023-01-01 PROCEDURE — 5A1945Z RESPIRATORY VENTILATION, 24-96 CONSECUTIVE HOURS: ICD-10-PCS | Performed by: EMERGENCY MEDICINE

## 2023-01-01 PROCEDURE — 87556 M.TUBERCULO DNA AMP PROBE: CPT

## 2023-01-01 PROCEDURE — 37799 UNLISTED PX VASCULAR SURGERY: CPT

## 2023-01-01 PROCEDURE — 82607 VITAMIN B-12: CPT

## 2023-01-01 PROCEDURE — 6360000002 HC RX W HCPCS: Performed by: INTERNAL MEDICINE

## 2023-01-01 PROCEDURE — 93005 ELECTROCARDIOGRAM TRACING: CPT | Performed by: EMERGENCY MEDICINE

## 2023-01-01 PROCEDURE — 36556 INSERT NON-TUNNEL CV CATH: CPT

## 2023-01-01 PROCEDURE — 83930 ASSAY OF BLOOD OSMOLALITY: CPT

## 2023-01-01 PROCEDURE — 87798 DETECT AGENT NOS DNA AMP: CPT

## 2023-01-01 PROCEDURE — 31500 INSERT EMERGENCY AIRWAY: CPT

## 2023-01-01 PROCEDURE — P9035 PLATELET PHERES LEUKOREDUCED: HCPCS

## 2023-01-01 PROCEDURE — 99285 EMERGENCY DEPT VISIT HI MDM: CPT

## 2023-01-01 PROCEDURE — 85018 HEMOGLOBIN: CPT

## 2023-01-01 PROCEDURE — 96375 TX/PRO/DX INJ NEW DRUG ADDON: CPT

## 2023-01-01 PROCEDURE — 87636 SARSCOV2 & INF A&B AMP PRB: CPT

## 2023-01-01 PROCEDURE — 87633 RESP VIRUS 12-25 TARGETS: CPT

## 2023-01-01 PROCEDURE — 87305 ASPERGILLUS AG IA: CPT

## 2023-01-01 PROCEDURE — 30233N1 TRANSFUSION OF NONAUTOLOGOUS RED BLOOD CELLS INTO PERIPHERAL VEIN, PERCUTANEOUS APPROACH: ICD-10-PCS | Performed by: INTERNAL MEDICINE

## 2023-01-01 PROCEDURE — 83550 IRON BINDING TEST: CPT

## 2023-01-01 PROCEDURE — 2580000003 HC RX 258: Performed by: NURSE PRACTITIONER

## 2023-01-01 PROCEDURE — 86403 PARTICLE AGGLUT ANTBDY SCRN: CPT

## 2023-01-01 PROCEDURE — 82728 ASSAY OF FERRITIN: CPT

## 2023-01-01 PROCEDURE — 93005 ELECTROCARDIOGRAM TRACING: CPT

## 2023-01-01 PROCEDURE — 80307 DRUG TEST PRSMV CHEM ANLYZR: CPT

## 2023-01-01 PROCEDURE — 93010 ELECTROCARDIOGRAM REPORT: CPT | Performed by: INTERNAL MEDICINE

## 2023-01-01 PROCEDURE — 86850 RBC ANTIBODY SCREEN: CPT

## 2023-01-01 PROCEDURE — 85045 AUTOMATED RETICULOCYTE COUNT: CPT

## 2023-01-01 PROCEDURE — 87252 VIRUS INOCULATION TISSUE: CPT

## 2023-01-01 PROCEDURE — P9017 PLASMA 1 DONOR FRZ W/IN 8 HR: HCPCS

## 2023-01-01 PROCEDURE — 87181 SC STD AGAR DILUTION PER AGT: CPT

## 2023-01-01 PROCEDURE — 83540 ASSAY OF IRON: CPT

## 2023-01-01 PROCEDURE — 0BH17EZ INSERTION OF ENDOTRACHEAL AIRWAY INTO TRACHEA, VIA NATURAL OR ARTIFICIAL OPENING: ICD-10-PCS | Performed by: EMERGENCY MEDICINE

## 2023-01-01 PROCEDURE — 83880 ASSAY OF NATRIURETIC PEPTIDE: CPT

## 2023-01-01 PROCEDURE — 87077 CULTURE AEROBIC IDENTIFY: CPT

## 2023-01-01 PROCEDURE — 86901 BLOOD TYPING SEROLOGIC RH(D): CPT

## 2023-01-01 PROCEDURE — 87205 SMEAR GRAM STAIN: CPT

## 2023-01-01 PROCEDURE — 89220 SPUTUM SPECIMEN COLLECTION: CPT

## 2023-01-01 PROCEDURE — 82077 ASSAY SPEC XCP UR&BREATH IA: CPT

## 2023-01-01 PROCEDURE — 86900 BLOOD TYPING SEROLOGIC ABO: CPT

## 2023-01-01 RX ORDER — ALBUTEROL SULFATE 2.5 MG/3ML
2.5 SOLUTION RESPIRATORY (INHALATION) ONCE
Status: DISCONTINUED | OUTPATIENT
Start: 2023-01-01 | End: 2023-01-01

## 2023-01-01 RX ORDER — SODIUM CHLORIDE, SODIUM LACTATE, POTASSIUM CHLORIDE, AND CALCIUM CHLORIDE .6; .31; .03; .02 G/100ML; G/100ML; G/100ML; G/100ML
30 INJECTION, SOLUTION INTRAVENOUS ONCE
Status: COMPLETED | OUTPATIENT
Start: 2023-01-01 | End: 2023-01-01

## 2023-01-01 RX ORDER — SODIUM CHLORIDE 0.9 % (FLUSH) 0.9 %
5-40 SYRINGE (ML) INJECTION PRN
Status: DISCONTINUED | OUTPATIENT
Start: 2023-01-01 | End: 2023-01-01 | Stop reason: HOSPADM

## 2023-01-01 RX ORDER — LORAZEPAM 2 MG/ML
1 INJECTION INTRAMUSCULAR ONCE
Status: COMPLETED | OUTPATIENT
Start: 2023-01-01 | End: 2023-01-01

## 2023-01-01 RX ORDER — SODIUM CHLORIDE, SODIUM LACTATE, POTASSIUM CHLORIDE, CALCIUM CHLORIDE 600; 310; 30; 20 MG/100ML; MG/100ML; MG/100ML; MG/100ML
INJECTION, SOLUTION INTRAVENOUS CONTINUOUS
Status: DISCONTINUED | OUTPATIENT
Start: 2023-01-01 | End: 2023-01-01

## 2023-01-01 RX ORDER — GABAPENTIN 300 MG/1
600 CAPSULE ORAL
COMMUNITY

## 2023-01-01 RX ORDER — FUROSEMIDE 10 MG/ML
40 INJECTION INTRAMUSCULAR; INTRAVENOUS ONCE
Status: COMPLETED | OUTPATIENT
Start: 2023-01-01 | End: 2023-01-01

## 2023-01-01 RX ORDER — SUCCINYLCHOLINE CHLORIDE 20 MG/ML
INJECTION INTRAMUSCULAR; INTRAVENOUS DAILY PRN
Status: COMPLETED | OUTPATIENT
Start: 2023-01-01 | End: 2023-01-01

## 2023-01-01 RX ORDER — DEXTROSE MONOHYDRATE 100 MG/ML
INJECTION, SOLUTION INTRAVENOUS CONTINUOUS
Status: DISPENSED | OUTPATIENT
Start: 2023-01-01 | End: 2023-01-01

## 2023-01-01 RX ORDER — PROPOFOL 10 MG/ML
5-50 INJECTION, EMULSION INTRAVENOUS CONTINUOUS
Status: DISCONTINUED | OUTPATIENT
Start: 2023-01-01 | End: 2023-01-01 | Stop reason: HOSPADM

## 2023-01-01 RX ORDER — NALOXONE HYDROCHLORIDE 1 MG/ML
INJECTION INTRAMUSCULAR; INTRAVENOUS; SUBCUTANEOUS
Status: COMPLETED
Start: 2023-01-01 | End: 2023-01-01

## 2023-01-01 RX ORDER — ALBUTEROL SULFATE 2.5 MG/3ML
10 SOLUTION RESPIRATORY (INHALATION) ONCE
Status: COMPLETED | OUTPATIENT
Start: 2023-01-01 | End: 2023-01-01

## 2023-01-01 RX ORDER — CALCIUM GLUCONATE 20 MG/ML
2000 INJECTION, SOLUTION INTRAVENOUS ONCE
Status: COMPLETED | OUTPATIENT
Start: 2023-01-01 | End: 2023-01-01

## 2023-01-01 RX ORDER — CASTOR OIL AND BALSAM, PERU 788; 87 MG/G; MG/G
OINTMENT TOPICAL 2 TIMES DAILY
Status: DISCONTINUED | OUTPATIENT
Start: 2023-01-01 | End: 2023-01-01 | Stop reason: HOSPADM

## 2023-01-01 RX ORDER — DEXTROSE MONOHYDRATE 100 MG/ML
INJECTION, SOLUTION INTRAVENOUS CONTINUOUS
Status: DISCONTINUED | OUTPATIENT
Start: 2023-01-01 | End: 2023-01-01 | Stop reason: HOSPADM

## 2023-01-01 RX ORDER — SODIUM CHLORIDE 9 MG/ML
INJECTION, SOLUTION INTRAVENOUS PRN
Status: DISCONTINUED | OUTPATIENT
Start: 2023-01-01 | End: 2023-01-01 | Stop reason: HOSPADM

## 2023-01-01 RX ORDER — SODIUM CHLORIDE, SODIUM LACTATE, POTASSIUM CHLORIDE, AND CALCIUM CHLORIDE .6; .31; .03; .02 G/100ML; G/100ML; G/100ML; G/100ML
1000 INJECTION, SOLUTION INTRAVENOUS ONCE
Status: COMPLETED | OUTPATIENT
Start: 2023-01-01 | End: 2023-01-01

## 2023-01-01 RX ORDER — ETOMIDATE 2 MG/ML
INJECTION INTRAVENOUS
Status: COMPLETED
Start: 2023-01-01 | End: 2023-01-01

## 2023-01-01 RX ORDER — PANTOPRAZOLE SODIUM 40 MG/10ML
40 INJECTION, POWDER, LYOPHILIZED, FOR SOLUTION INTRAVENOUS DAILY
Status: DISCONTINUED | OUTPATIENT
Start: 2023-01-01 | End: 2023-01-01 | Stop reason: HOSPADM

## 2023-01-01 RX ORDER — ETOMIDATE 2 MG/ML
INJECTION INTRAVENOUS DAILY PRN
Status: COMPLETED | OUTPATIENT
Start: 2023-01-01 | End: 2023-01-01

## 2023-01-01 RX ORDER — SUCCINYLCHOLINE CHLORIDE 20 MG/ML
INJECTION INTRAMUSCULAR; INTRAVENOUS
Status: COMPLETED
Start: 2023-01-01 | End: 2023-01-01

## 2023-01-01 RX ORDER — DEXTROSE MONOHYDRATE 100 MG/ML
INJECTION, SOLUTION INTRAVENOUS CONTINUOUS PRN
Status: DISCONTINUED | OUTPATIENT
Start: 2023-01-01 | End: 2023-01-01 | Stop reason: HOSPADM

## 2023-01-01 RX ORDER — SODIUM CHLORIDE, SODIUM LACTATE, POTASSIUM CHLORIDE, CALCIUM CHLORIDE 600; 310; 30; 20 MG/100ML; MG/100ML; MG/100ML; MG/100ML
INJECTION, SOLUTION INTRAVENOUS CONTINUOUS
Status: ACTIVE | OUTPATIENT
Start: 2023-01-01 | End: 2023-01-01

## 2023-01-01 RX ORDER — ACETAMINOPHEN 650 MG/1
650 SUPPOSITORY RECTAL EVERY 6 HOURS PRN
Status: DISCONTINUED | OUTPATIENT
Start: 2023-01-01 | End: 2023-01-01 | Stop reason: HOSPADM

## 2023-01-01 RX ORDER — DEXTROSE MONOHYDRATE 100 MG/ML
INJECTION, SOLUTION INTRAVENOUS
Status: COMPLETED
Start: 2023-01-01 | End: 2023-01-01

## 2023-01-01 RX ORDER — PROPOFOL 10 MG/ML
INJECTION, EMULSION INTRAVENOUS
Status: COMPLETED
Start: 2023-01-01 | End: 2023-01-01

## 2023-01-01 RX ORDER — ENOXAPARIN SODIUM 100 MG/ML
40 INJECTION SUBCUTANEOUS DAILY
Status: DISCONTINUED | OUTPATIENT
Start: 2023-01-01 | End: 2023-01-01

## 2023-01-01 RX ORDER — ACETAMINOPHEN 325 MG/1
650 TABLET ORAL EVERY 6 HOURS PRN
Status: DISCONTINUED | OUTPATIENT
Start: 2023-01-01 | End: 2023-01-01 | Stop reason: HOSPADM

## 2023-01-01 RX ORDER — ACETAMINOPHEN/DIPHENHYDRAMINE 500MG-25MG
1-2 TABLET ORAL NIGHTLY PRN
COMMUNITY

## 2023-01-01 RX ORDER — SODIUM CHLORIDE 0.9 % (FLUSH) 0.9 %
5-40 SYRINGE (ML) INJECTION EVERY 12 HOURS SCHEDULED
Status: DISCONTINUED | OUTPATIENT
Start: 2023-01-01 | End: 2023-01-01 | Stop reason: HOSPADM

## 2023-01-01 RX ORDER — MIDAZOLAM HYDROCHLORIDE 1 MG/ML
2 INJECTION INTRAMUSCULAR; INTRAVENOUS ONCE
Status: COMPLETED | OUTPATIENT
Start: 2023-01-01 | End: 2023-01-01

## 2023-01-01 RX ADMIN — MEROPENEM 1000 MG: 1 INJECTION, POWDER, FOR SOLUTION INTRAVENOUS at 06:27

## 2023-01-01 RX ADMIN — PANTOPRAZOLE SODIUM 40 MG: 40 INJECTION, POWDER, FOR SOLUTION INTRAVENOUS at 08:55

## 2023-01-01 RX ADMIN — ETOMIDATE 20 MG: 2 INJECTION, SOLUTION INTRAVENOUS at 21:41

## 2023-01-01 RX ADMIN — SUCCINYLCHOLINE CHLORIDE 100 MG: 20 INJECTION, SOLUTION INTRAMUSCULAR; INTRAVENOUS; PARENTERAL at 21:42

## 2023-01-01 RX ADMIN — DEXTROSE MONOHYDRATE 250 ML: 10 INJECTION, SOLUTION INTRAVENOUS at 08:45

## 2023-01-01 RX ADMIN — DEXTROSE MONOHYDRATE 250 ML: 10 INJECTION, SOLUTION INTRAVENOUS at 11:50

## 2023-01-01 RX ADMIN — PHENYLEPHRINE HYDROCHLORIDE 300 MCG/MIN: 50 INJECTION INTRAVENOUS at 20:22

## 2023-01-01 RX ADMIN — DEXTROSE MONOHYDRATE: 10 INJECTION, SOLUTION INTRAVENOUS at 00:37

## 2023-01-01 RX ADMIN — PHYTONADIONE 10 MG: 10 INJECTION, EMULSION INTRAMUSCULAR; INTRAVENOUS; SUBCUTANEOUS at 15:36

## 2023-01-01 RX ADMIN — PHENYLEPHRINE HYDROCHLORIDE 30 MCG/MIN: 50 INJECTION INTRAVENOUS at 05:07

## 2023-01-01 RX ADMIN — MIDAZOLAM 2 MG: 1 INJECTION INTRAMUSCULAR; INTRAVENOUS at 00:10

## 2023-01-01 RX ADMIN — VASOPRESSIN 0.03 UNITS/MIN: 20 INJECTION, SOLUTION INTRAVENOUS at 05:33

## 2023-01-01 RX ADMIN — SODIUM BICARBONATE: 84 INJECTION, SOLUTION INTRAVENOUS at 20:10

## 2023-01-01 RX ADMIN — DEXTROSE MONOHYDRATE 250 ML: 100 INJECTION, SOLUTION INTRAVENOUS at 21:45

## 2023-01-01 RX ADMIN — DEXTROSE MONOHYDRATE: 10 INJECTION, SOLUTION INTRAVENOUS at 19:07

## 2023-01-01 RX ADMIN — NOREPINEPHRINE BITARTRATE 90 MCG/MIN: 1 INJECTION, SOLUTION, CONCENTRATE INTRAVENOUS at 02:08

## 2023-01-01 RX ADMIN — CALCIUM GLUCONATE 2000 MG: 20 INJECTION, SOLUTION INTRAVENOUS at 21:17

## 2023-01-01 RX ADMIN — AZITHROMYCIN MONOHYDRATE 500 MG: 500 INJECTION, POWDER, LYOPHILIZED, FOR SOLUTION INTRAVENOUS at 23:06

## 2023-01-01 RX ADMIN — Medication: at 06:10

## 2023-01-01 RX ADMIN — VASOPRESSIN 0.03 UNITS/MIN: 20 INJECTION, SOLUTION INTRAVENOUS at 10:33

## 2023-01-01 RX ADMIN — SODIUM CHLORIDE, POTASSIUM CHLORIDE, SODIUM LACTATE AND CALCIUM CHLORIDE: 600; 310; 30; 20 INJECTION, SOLUTION INTRAVENOUS at 17:24

## 2023-01-01 RX ADMIN — SODIUM BICARBONATE: 84 INJECTION, SOLUTION INTRAVENOUS at 08:21

## 2023-01-01 RX ADMIN — PHENYLEPHRINE HYDROCHLORIDE 300 MCG/MIN: 50 INJECTION INTRAVENOUS at 23:42

## 2023-01-01 RX ADMIN — PHENYLEPHRINE HYDROCHLORIDE 300 MCG/MIN: 50 INJECTION INTRAVENOUS at 04:15

## 2023-01-01 RX ADMIN — DEXTROSE MONOHYDRATE: 10 INJECTION, SOLUTION INTRAVENOUS at 10:52

## 2023-01-01 RX ADMIN — HYDROCORTISONE SODIUM SUCCINATE 100 MG: 100 INJECTION, POWDER, FOR SOLUTION INTRAMUSCULAR; INTRAVENOUS at 21:02

## 2023-01-01 RX ADMIN — AZITHROMYCIN MONOHYDRATE 500 MG: 500 INJECTION, POWDER, LYOPHILIZED, FOR SOLUTION INTRAVENOUS at 08:50

## 2023-01-01 RX ADMIN — NOREPINEPHRINE BITARTRATE 100 MCG/MIN: 1 INJECTION, SOLUTION, CONCENTRATE INTRAVENOUS at 11:08

## 2023-01-01 RX ADMIN — PHENYLEPHRINE HYDROCHLORIDE 300 MCG/MIN: 50 INJECTION INTRAVENOUS at 17:49

## 2023-01-01 RX ADMIN — NALOXONE HYDROCHLORIDE 1 MG: 1 INJECTION PARENTERAL at 21:48

## 2023-01-01 RX ADMIN — SODIUM CHLORIDE, PRESERVATIVE FREE 10 ML: 5 INJECTION INTRAVENOUS at 20:00

## 2023-01-01 RX ADMIN — PHENYLEPHRINE HYDROCHLORIDE 300 MCG/MIN: 50 INJECTION INTRAVENOUS at 08:35

## 2023-01-01 RX ADMIN — NOREPINEPHRINE BITARTRATE 100 MCG/MIN: 1 INJECTION, SOLUTION, CONCENTRATE INTRAVENOUS at 20:00

## 2023-01-01 RX ADMIN — PHENYLEPHRINE HYDROCHLORIDE 300 MCG/MIN: 50 INJECTION INTRAVENOUS at 06:58

## 2023-01-01 RX ADMIN — PHENYLEPHRINE HYDROCHLORIDE 300 MCG/MIN: 50 INJECTION INTRAVENOUS at 14:00

## 2023-01-01 RX ADMIN — DEXTROSE MONOHYDRATE: 10 INJECTION, SOLUTION INTRAVENOUS at 22:45

## 2023-01-01 RX ADMIN — MEROPENEM 1000 MG: 1 INJECTION, POWDER, FOR SOLUTION INTRAVENOUS at 15:18

## 2023-01-01 RX ADMIN — SODIUM CHLORIDE, POTASSIUM CHLORIDE, SODIUM LACTATE AND CALCIUM CHLORIDE 1000 ML: 600; 310; 30; 20 INJECTION, SOLUTION INTRAVENOUS at 05:39

## 2023-01-01 RX ADMIN — PROPOFOL 20 MCG/KG/MIN: 10 INJECTION, EMULSION INTRAVENOUS at 01:36

## 2023-01-01 RX ADMIN — HYDROCORTISONE SODIUM SUCCINATE 100 MG: 100 INJECTION, POWDER, FOR SOLUTION INTRAMUSCULAR; INTRAVENOUS at 21:17

## 2023-01-01 RX ADMIN — SODIUM CHLORIDE, POTASSIUM CHLORIDE, SODIUM LACTATE AND CALCIUM CHLORIDE: 600; 310; 30; 20 INJECTION, SOLUTION INTRAVENOUS at 23:50

## 2023-01-01 RX ADMIN — PHENYLEPHRINE HYDROCHLORIDE 300 MCG/MIN: 50 INJECTION INTRAVENOUS at 11:36

## 2023-01-01 RX ADMIN — CALCIUM GLUCONATE 2000 MG: 20 INJECTION, SOLUTION INTRAVENOUS at 03:22

## 2023-01-01 RX ADMIN — HYDROCORTISONE SODIUM SUCCINATE 100 MG: 100 INJECTION, POWDER, FOR SOLUTION INTRAMUSCULAR; INTRAVENOUS at 05:48

## 2023-01-01 RX ADMIN — PANTOPRAZOLE SODIUM 40 MG: 40 INJECTION, POWDER, FOR SOLUTION INTRAVENOUS at 15:31

## 2023-01-01 RX ADMIN — SUCCINYLCHOLINE CHLORIDE 200 MG: 20 INJECTION, SOLUTION INTRAMUSCULAR; INTRAVENOUS at 21:42

## 2023-01-01 RX ADMIN — DEXTROSE MONOHYDRATE: 10 INJECTION, SOLUTION INTRAVENOUS at 19:14

## 2023-01-01 RX ADMIN — PHENYLEPHRINE HYDROCHLORIDE 300 MCG/MIN: 50 INJECTION INTRAVENOUS at 01:34

## 2023-01-01 RX ADMIN — SODIUM BICARBONATE 100 MEQ: 84 INJECTION, SOLUTION INTRAVENOUS at 19:07

## 2023-01-01 RX ADMIN — SODIUM BICARBONATE: 84 INJECTION, SOLUTION INTRAVENOUS at 16:53

## 2023-01-01 RX ADMIN — DEXTROSE MONOHYDRATE: 10 INJECTION, SOLUTION INTRAVENOUS at 06:05

## 2023-01-01 RX ADMIN — NOREPINEPHRINE BITARTRATE 80 MCG/MIN: 1 INJECTION, SOLUTION, CONCENTRATE INTRAVENOUS at 08:14

## 2023-01-01 RX ADMIN — DEXTROSE MONOHYDRATE 125 ML: 10 INJECTION, SOLUTION INTRAVENOUS at 05:25

## 2023-01-01 RX ADMIN — MEROPENEM 1000 MG: 1 INJECTION, POWDER, FOR SOLUTION INTRAVENOUS at 05:06

## 2023-01-01 RX ADMIN — PHENYLEPHRINE HYDROCHLORIDE 300 MCG/MIN: 50 INJECTION INTRAVENOUS at 05:09

## 2023-01-01 RX ADMIN — DEXTROSE MONOHYDRATE 125 ML: 10 INJECTION, SOLUTION INTRAVENOUS at 02:04

## 2023-01-01 RX ADMIN — FUROSEMIDE 40 MG: 10 INJECTION, SOLUTION INTRAMUSCULAR; INTRAVENOUS at 00:37

## 2023-01-01 RX ADMIN — PHENYLEPHRINE HYDROCHLORIDE 300 MCG/MIN: 50 INJECTION INTRAVENOUS at 11:06

## 2023-01-01 RX ADMIN — DEXTROSE MONOHYDRATE: 10 INJECTION, SOLUTION INTRAVENOUS at 02:13

## 2023-01-01 RX ADMIN — NOREPINEPHRINE BITARTRATE 100 MCG/MIN: 1 INJECTION, SOLUTION, CONCENTRATE INTRAVENOUS at 14:00

## 2023-01-01 RX ADMIN — ALBUTEROL SULFATE 10 MG: 2.5 SOLUTION RESPIRATORY (INHALATION) at 04:12

## 2023-01-01 RX ADMIN — DEXTROSE MONOHYDRATE: 10 INJECTION, SOLUTION INTRAVENOUS at 04:59

## 2023-01-01 RX ADMIN — NOREPINEPHRINE BITARTRATE 90 MCG/MIN: 1 INJECTION, SOLUTION, CONCENTRATE INTRAVENOUS at 20:22

## 2023-01-01 RX ADMIN — MEROPENEM 1000 MG: 1 INJECTION, POWDER, FOR SOLUTION INTRAVENOUS at 21:04

## 2023-01-01 RX ADMIN — LORAZEPAM 1 MG: 2 INJECTION INTRAMUSCULAR; INTRAVENOUS at 03:11

## 2023-01-01 RX ADMIN — DEXTROSE MONOHYDRATE: 10 INJECTION, SOLUTION INTRAVENOUS at 15:47

## 2023-01-01 RX ADMIN — SODIUM CHLORIDE, POTASSIUM CHLORIDE, SODIUM LACTATE AND CALCIUM CHLORIDE: 600; 310; 30; 20 INJECTION, SOLUTION INTRAVENOUS at 01:50

## 2023-01-01 RX ADMIN — NOREPINEPHRINE BITARTRATE 100 MCG/MIN: 1 INJECTION, SOLUTION, CONCENTRATE INTRAVENOUS at 17:37

## 2023-01-01 RX ADMIN — SODIUM CHLORIDE, POTASSIUM CHLORIDE, SODIUM LACTATE AND CALCIUM CHLORIDE 1782 ML: 600; 310; 30; 20 INJECTION, SOLUTION INTRAVENOUS at 22:39

## 2023-01-01 RX ADMIN — VANCOMYCIN HYDROCHLORIDE 750 MG: 10 INJECTION, POWDER, LYOPHILIZED, FOR SOLUTION INTRAVENOUS at 18:30

## 2023-01-01 RX ADMIN — EPINEPHRINE 1 MCG/MIN: 1 INJECTION INTRAMUSCULAR; INTRAVENOUS; SUBCUTANEOUS at 14:09

## 2023-01-01 RX ADMIN — PHENYLEPHRINE HYDROCHLORIDE 300 MCG/MIN: 50 INJECTION INTRAVENOUS at 22:52

## 2023-01-01 RX ADMIN — HYDROCORTISONE SODIUM SUCCINATE 100 MG: 100 INJECTION, POWDER, FOR SOLUTION INTRAMUSCULAR; INTRAVENOUS at 05:30

## 2023-01-01 RX ADMIN — PHENYLEPHRINE HYDROCHLORIDE 300 MCG/MIN: 50 INJECTION INTRAVENOUS at 02:26

## 2023-01-01 RX ADMIN — NOREPINEPHRINE BITARTRATE 60 MCG/MIN: 1 INJECTION, SOLUTION, CONCENTRATE INTRAVENOUS at 07:16

## 2023-01-01 RX ADMIN — HYDROCORTISONE SODIUM SUCCINATE 100 MG: 100 INJECTION, POWDER, FOR SOLUTION INTRAMUSCULAR; INTRAVENOUS at 15:07

## 2023-01-01 RX ADMIN — PHENYLEPHRINE HYDROCHLORIDE 300 MCG/MIN: 50 INJECTION INTRAVENOUS at 08:47

## 2023-01-01 RX ADMIN — IOPAMIDOL 75 ML: 755 INJECTION, SOLUTION INTRAVENOUS at 01:05

## 2023-01-01 RX ADMIN — DEXTROSE MONOHYDRATE: 10 INJECTION, SOLUTION INTRAVENOUS at 05:34

## 2023-01-01 RX ADMIN — NOREPINEPHRINE BITARTRATE 90 MCG/MIN: 1 INJECTION, SOLUTION, CONCENTRATE INTRAVENOUS at 05:03

## 2023-01-01 RX ADMIN — MEROPENEM 1000 MG: 1 INJECTION, POWDER, FOR SOLUTION INTRAVENOUS at 05:35

## 2023-01-01 RX ADMIN — NOREPINEPHRINE BITARTRATE 45 MCG/MIN: 1 INJECTION, SOLUTION, CONCENTRATE INTRAVENOUS at 00:36

## 2023-01-01 RX ADMIN — NOREPINEPHRINE BITARTRATE 65 MCG/MIN: 1 INJECTION, SOLUTION, CONCENTRATE INTRAVENOUS at 13:17

## 2023-01-01 RX ADMIN — HYDROCORTISONE SODIUM SUCCINATE 100 MG: 100 INJECTION, POWDER, FOR SOLUTION INTRAMUSCULAR; INTRAVENOUS at 15:35

## 2023-01-01 RX ADMIN — NOREPINEPHRINE BITARTRATE 100 MCG/MIN: 1 INJECTION, SOLUTION, CONCENTRATE INTRAVENOUS at 17:50

## 2023-01-01 RX ADMIN — NOREPINEPHRINE BITARTRATE 90 MCG/MIN: 1 INJECTION, SOLUTION, CONCENTRATE INTRAVENOUS at 22:52

## 2023-01-01 RX ADMIN — PHENYLEPHRINE HYDROCHLORIDE 300 MCG/MIN: 50 INJECTION INTRAVENOUS at 14:41

## 2023-01-01 RX ADMIN — DEXTROSE MONOHYDRATE: 10 INJECTION, SOLUTION INTRAVENOUS at 23:45

## 2023-01-01 RX ADMIN — CALCIUM GLUCONATE 2000 MG: 20 INJECTION, SOLUTION INTRAVENOUS at 08:28

## 2023-01-01 RX ADMIN — VASOPRESSIN 0.03 UNITS/MIN: 20 INJECTION, SOLUTION INTRAVENOUS at 16:06

## 2023-01-01 RX ADMIN — MEROPENEM 1000 MG: 1 INJECTION, POWDER, FOR SOLUTION INTRAVENOUS at 15:00

## 2023-01-01 RX ADMIN — Medication: at 21:21

## 2023-01-01 RX ADMIN — NOREPINEPHRINE BITARTRATE 40 MCG/MIN: 1 INJECTION, SOLUTION, CONCENTRATE INTRAVENOUS at 06:56

## 2023-01-01 RX ADMIN — VANCOMYCIN HYDROCHLORIDE 1500 MG: 10 INJECTION, POWDER, LYOPHILIZED, FOR SOLUTION INTRAVENOUS at 22:55

## 2023-01-01 RX ADMIN — CEFEPIME 2000 MG: 2 INJECTION, POWDER, FOR SOLUTION INTRAVENOUS at 22:34

## 2023-01-01 RX ADMIN — CALCIUM GLUCONATE 2000 MG: 20 INJECTION, SOLUTION INTRAVENOUS at 05:09

## 2023-01-01 RX ADMIN — SODIUM CHLORIDE, POTASSIUM CHLORIDE, SODIUM LACTATE AND CALCIUM CHLORIDE: 600; 310; 30; 20 INJECTION, SOLUTION INTRAVENOUS at 10:33

## 2023-01-01 RX ADMIN — SODIUM CHLORIDE, PRESERVATIVE FREE 10 ML: 5 INJECTION INTRAVENOUS at 21:22

## 2023-01-01 RX ADMIN — DEXTROSE MONOHYDRATE 125 ML: 10 INJECTION, SOLUTION INTRAVENOUS at 03:16

## 2023-01-01 RX ADMIN — NOREPINEPHRINE BITARTRATE 5 MCG/MIN: 1 INJECTION, SOLUTION, CONCENTRATE INTRAVENOUS at 23:12

## 2023-01-01 RX ADMIN — SODIUM BICARBONATE: 84 INJECTION, SOLUTION INTRAVENOUS at 15:38

## 2023-01-01 RX ADMIN — AZITHROMYCIN MONOHYDRATE 500 MG: 500 INJECTION, POWDER, LYOPHILIZED, FOR SOLUTION INTRAVENOUS at 23:51

## 2023-01-01 RX ADMIN — PHENYLEPHRINE HYDROCHLORIDE 300 MCG/MIN: 50 INJECTION INTRAVENOUS at 20:04

## 2023-01-01 RX ADMIN — DEXTROSE MONOHYDRATE 125 ML: 10 INJECTION, SOLUTION INTRAVENOUS at 22:35

## 2023-01-01 RX ADMIN — AZITHROMYCIN MONOHYDRATE 500 MG: 500 INJECTION, POWDER, LYOPHILIZED, FOR SOLUTION INTRAVENOUS at 08:24

## 2023-01-01 RX ADMIN — DEXTROSE MONOHYDRATE: 10 INJECTION, SOLUTION INTRAVENOUS at 03:25

## 2023-01-01 RX ADMIN — HYDROCORTISONE SODIUM SUCCINATE 100 MG: 100 INJECTION, POWDER, FOR SOLUTION INTRAMUSCULAR; INTRAVENOUS at 05:04

## 2023-01-01 RX ADMIN — DEXTROSE MONOHYDRATE 250 ML: 10 INJECTION, SOLUTION INTRAVENOUS at 15:11

## 2023-01-01 RX ADMIN — PHYTONADIONE 10 MG: 10 INJECTION, EMULSION INTRAMUSCULAR; INTRAVENOUS; SUBCUTANEOUS at 10:32

## 2023-01-01 RX ADMIN — VASOPRESSIN 0.03 UNITS/MIN: 20 INJECTION, SOLUTION INTRAVENOUS at 06:08

## 2023-01-01 RX ADMIN — SODIUM CHLORIDE, PRESERVATIVE FREE 10 ML: 5 INJECTION INTRAVENOUS at 09:07

## 2023-01-01 RX ADMIN — SODIUM BICARBONATE: 84 INJECTION, SOLUTION INTRAVENOUS at 03:14

## 2023-01-01 RX ADMIN — SODIUM BICARBONATE: 84 INJECTION, SOLUTION INTRAVENOUS at 01:08

## 2023-01-01 RX ADMIN — VASOPRESSIN 0.03 UNITS/MIN: 20 INJECTION, SOLUTION INTRAVENOUS at 20:05

## 2023-01-01 RX ADMIN — MEROPENEM 1000 MG: 1 INJECTION, POWDER, FOR SOLUTION INTRAVENOUS at 21:21

## 2023-01-01 RX ADMIN — SODIUM CHLORIDE, PRESERVATIVE FREE 10 ML: 5 INJECTION INTRAVENOUS at 08:50

## 2023-01-01 ASSESSMENT — PULMONARY FUNCTION TESTS
PIF_VALUE: 33
PIF_VALUE: 33
PIF_VALUE: 30
PIF_VALUE: 32
PIF_VALUE: 35
PIF_VALUE: 31
PIF_VALUE: 32
PIF_VALUE: 32
PIF_VALUE: 34
PIF_VALUE: 29
PIF_VALUE: 28
PIF_VALUE: 33
PIF_VALUE: 29
PIF_VALUE: 33
PIF_VALUE: 34
PIF_VALUE: 29
PIF_VALUE: 28
PIF_VALUE: 32
PIF_VALUE: 35
PIF_VALUE: 33
PIF_VALUE: 32
PIF_VALUE: 34
PIF_VALUE: 35
PIF_VALUE: 30
PIF_VALUE: 33
PIF_VALUE: 26
PIF_VALUE: 30
PIF_VALUE: 32
PIF_VALUE: 25
PIF_VALUE: 32
PIF_VALUE: 36
PIF_VALUE: 31
PIF_VALUE: 33
PIF_VALUE: 35
PIF_VALUE: 31
PIF_VALUE: 33
PIF_VALUE: 26
PIF_VALUE: 34
PIF_VALUE: 24
PIF_VALUE: 31
PIF_VALUE: 34
PIF_VALUE: 31
PIF_VALUE: 37
PIF_VALUE: 33
PIF_VALUE: 24
PIF_VALUE: 31
PIF_VALUE: 33
PIF_VALUE: 31
PIF_VALUE: 32
PIF_VALUE: 30
PIF_VALUE: 37
PIF_VALUE: 34
PIF_VALUE: 31
PIF_VALUE: 33
PIF_VALUE: 30
PIF_VALUE: 26
PIF_VALUE: 35
PIF_VALUE: 32
PIF_VALUE: 26
PIF_VALUE: 35
PIF_VALUE: 31
PIF_VALUE: 27
PIF_VALUE: 29
PIF_VALUE: 31
PIF_VALUE: 33
PIF_VALUE: 33
PIF_VALUE: 26
PIF_VALUE: 31
PIF_VALUE: 33
PIF_VALUE: 33
PIF_VALUE: 24
PIF_VALUE: 32
PIF_VALUE: 32
PIF_VALUE: 33
PIF_VALUE: 29
PIF_VALUE: 34
PIF_VALUE: 34
PIF_VALUE: 32
PIF_VALUE: 30
PIF_VALUE: 33
PIF_VALUE: 32
PIF_VALUE: 35
PIF_VALUE: 33
PIF_VALUE: 34
PIF_VALUE: 33
PIF_VALUE: 32
PIF_VALUE: 32
PIF_VALUE: 34
PIF_VALUE: 28
PIF_VALUE: 33
PIF_VALUE: 34
PIF_VALUE: 31
PIF_VALUE: 32
PIF_VALUE: 29
PIF_VALUE: 30
PIF_VALUE: 29
PIF_VALUE: 25
PIF_VALUE: 32
PIF_VALUE: 31
PIF_VALUE: 29
PIF_VALUE: 32
PIF_VALUE: 35
PIF_VALUE: 37
PIF_VALUE: 31
PIF_VALUE: 29
PIF_VALUE: 38
PIF_VALUE: 26
PIF_VALUE: 34
PIF_VALUE: 25
PIF_VALUE: 36
PIF_VALUE: 26
PIF_VALUE: 31
PIF_VALUE: 29
PIF_VALUE: 32
PIF_VALUE: 31
PIF_VALUE: 32
PIF_VALUE: 30
PIF_VALUE: 31
PIF_VALUE: 33
PIF_VALUE: 32
PIF_VALUE: 23
PIF_VALUE: 34
PIF_VALUE: 31
PIF_VALUE: 28
PIF_VALUE: 31
PIF_VALUE: 32
PIF_VALUE: 25
PIF_VALUE: 31
PIF_VALUE: 32
PIF_VALUE: 33
PIF_VALUE: 28
PIF_VALUE: 28
PIF_VALUE: 29
PIF_VALUE: 32
PIF_VALUE: 34
PIF_VALUE: 24
PIF_VALUE: 35
PIF_VALUE: 26
PIF_VALUE: 37
PIF_VALUE: 32
PIF_VALUE: 33
PIF_VALUE: 34
PIF_VALUE: 28
PIF_VALUE: 31
PIF_VALUE: 29
PIF_VALUE: 27
PIF_VALUE: 35
PIF_VALUE: 30
PIF_VALUE: 25
PIF_VALUE: 28
PIF_VALUE: 29
PIF_VALUE: 35
PIF_VALUE: 33
PIF_VALUE: 33
PIF_VALUE: 27
PIF_VALUE: 32
PIF_VALUE: 31
PIF_VALUE: 31
PIF_VALUE: 33
PIF_VALUE: 32
PIF_VALUE: 28
PIF_VALUE: 32
PIF_VALUE: 35
PIF_VALUE: 32
PIF_VALUE: 34
PIF_VALUE: 33
PIF_VALUE: 34
PIF_VALUE: 33
PIF_VALUE: 31
PIF_VALUE: 33
PIF_VALUE: 30
PIF_VALUE: 28
PIF_VALUE: 36
PIF_VALUE: 34
PIF_VALUE: 35
PIF_VALUE: 32
PIF_VALUE: 28
PIF_VALUE: 34
PIF_VALUE: 29
PIF_VALUE: 33
PIF_VALUE: 11
PIF_VALUE: 32
PIF_VALUE: 29
PIF_VALUE: 29
PIF_VALUE: 28
PIF_VALUE: 34
PIF_VALUE: 31
PIF_VALUE: 32
PIF_VALUE: 31
PIF_VALUE: 32
PIF_VALUE: 27
PIF_VALUE: 31
PIF_VALUE: 30
PIF_VALUE: 33
PIF_VALUE: 31
PIF_VALUE: 29
PIF_VALUE: 31
PIF_VALUE: 28
PIF_VALUE: 31
PIF_VALUE: 28
PIF_VALUE: 30
PIF_VALUE: 32
PIF_VALUE: 34
PIF_VALUE: 28
PIF_VALUE: 33
PIF_VALUE: 33
PIF_VALUE: 32
PIF_VALUE: 32
PIF_VALUE: 29
PIF_VALUE: 35
PIF_VALUE: 27
PIF_VALUE: 25
PIF_VALUE: 31
PIF_VALUE: 33
PIF_VALUE: 34

## 2023-01-01 ASSESSMENT — ENCOUNTER SYMPTOMS
ALLERGIC/IMMUNOLOGIC NEGATIVE: 1
SHORTNESS OF BREATH: 1

## 2023-08-15 PROBLEM — R65.21 SEPTIC SHOCK (HCC): Status: ACTIVE | Noted: 2023-01-01

## 2023-08-15 PROBLEM — A41.9 SEPTIC SHOCK (HCC): Status: ACTIVE | Noted: 2023-01-01

## 2023-08-16 PROBLEM — E87.20 LACTIC ACIDOSIS: Status: ACTIVE | Noted: 2023-01-01

## 2023-08-16 PROBLEM — J96.01 ACUTE RESPIRATORY FAILURE WITH HYPOXIA AND HYPERCAPNIA (HCC): Status: ACTIVE | Noted: 2023-01-01

## 2023-08-16 PROBLEM — J96.02 ACUTE RESPIRATORY FAILURE WITH HYPOXIA AND HYPERCAPNIA (HCC): Status: ACTIVE | Noted: 2023-01-01

## 2023-08-16 PROBLEM — J98.4 CAVITARY PNEUMONIA: Status: ACTIVE | Noted: 2023-01-01

## 2023-08-16 PROBLEM — J18.9 CAVITARY PNEUMONIA: Status: ACTIVE | Noted: 2023-01-01

## 2023-08-16 NOTE — ED PROVIDER NOTES
[facility]          ATTENDING PHYSICIAN NOTE       Date of evaluation: 8/15/2023    Chief Complaint     Loss of Consciousness (Brought in by EMS, Unresponsive , bagged and CPR on route , also was given narcan by EMTs)      History of Present Illness     Isabella Yang is a 30 y.o. male who presents from home where he called out to EMS for difficulty breathing. EMS found him in a wheelchair upon arrival at his home and not long after they arrived on scene, the patient became unresponsive and stopped breathing. They had to begin assisted ventilations with bag valve mask and also felt they lost a pulse briefly and started chest compressions. They did read again a pulse. He arrives with an IO in place on the left tibia and under assisted ventilatory support with bag valve mask ventilation. There is some history of alcohol use and possible stroke but his baseline is not known. Mother called in to give additional information. 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 He can use his arms. He has no underlying pulmonary pathology. His only medication is gabapentin. He has been ill with an upper respiratory illness for the last few days with increasing shortness of breath and his mother has been giving him her nebulizer treatments to help. 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 ASSESSMENT / PLAN  (MEDICAL DECISION MAKING)     INITIAL VITALS: BP: (!) 69/34,  , Pulse: (!) 137, Respirations: 29, SpO2: (!) 85 %      Isabella Yang is a 30 y.o. male ending unresponsive from home, who became apneic and required ventilatory support. On arrival the patient was intubated. Once the airway was stabilized his circulation was addressed with IV fluids.   At this time care cabinet override    etomidate (AMIDATE) 2 MG/ML injection     Keily Armstrong Breeding: cabinet override    dextrose 10 % infusion     Valentine Jeff: cabinet override    naloxone [facility]) 2 MG/2ML injection     Valentine Frencheller: cabinet override    lactated ringers bolus bolus 1,782 mL    etomidate (AMIDATE) injection    succinylcholine (ANECTINE) injection    ceFEPIme (MAXIPIME) 2,000 mg in sodium chloride 0.9 % 50 mL IVPB (mini-bag)     Order Specific Question:   Antimicrobial Indications     Answer:   Sepsis of Unknown Etiology     Order Specific Question:   Sepsis duration of therapy     Answer: Other    azithromycin (ZITHROMAX) 500 mg in sodium chloride 0.9 % 250 mL IVPB     Order Specific Question:   Antimicrobial Indications     Answer:   Pneumonia (CAP)    vancomycin (VANCOCIN) 1,500 mg in sodium chloride 0.9 % 250 mL IVPB     Order Specific Question:   Antimicrobial Indications     Answer:   Sepsis of Unknown Etiology     Order Specific Question:   Sepsis duration of therapy     Answer: Other     Order Specific Question:   Other Sepsis Duration     Answer:   once       CONSULTS:  PHARMACY TO DOSE VANCOMYCIN    Review of Systems     Review of Systems   Unable to perform ROS: Patient unresponsive     Past Medical, Surgical, Family, and Social History     He has no past medical history on file. He has no past surgical history on file. His family history is not on file. He     Medications     Previous Medications    No medications on file       Allergies     He has no allergies on file. Physical Exam     INITIAL VITALS: BP: (!) 69/34,  , Pulse: (!) 137, Respirations: 29, SpO2: (!) 85 %   Physical Exam  Vitals and nursing note reviewed. Constitutional:       General: He is not in acute distress. Appearance: He is well-developed. He is not diaphoretic. Cardiovascular:      Rate and Rhythm: Normal rate and regular rhythm. Pulmonary:      Effort: Respiratory distress present.       Breath sounds:

## 2023-08-16 NOTE — CONSULTS
Infectious Diseases Inpatient Consult Note    Medical Student note - reviewed and modified, see Attending addendum at bottom    Reason for Consult:   Septic shock  Requesting Physician:   Colton Alberts MD  Primary Care Physician:  Pcp No  History Obtained From:   Pt, EPIC    Admit Date: 8/15/2023  Hospital Day: 2    CHIEF COMPLAINT:     Respiratory distress, LOC  Chief Complaint   Patient presents with    Loss of Consciousness     Brought in by EMS, Unresponsive , bagged and CPR on route , also was given narcan by EMTs       HISTORY OF PRESENT ILLNESS:      31 yo male  PMH: Chronic EtOH use, Sarcoidosis, AIDP; Wheelchair-bound - per mother, 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  PSH:     - Pt has had URI Sx's for last few days w/ SOB - using his mother's nebulizer  - Significant recent weight-loss    8/15/2023 to ED by EMS for LOC - CPR / BVM en route, given Narcan; IO placed  - Intubated on arrival  - Hypotensive 69/34 / Tachycardic 134  - WBC 23, Lactate 14.5,  VBG - primarily respiratory acidosis  -  Chest XR - extensive b/l airspace disease w/ cavitary masslike areas of consolidation   - Tx Azithromycin, Cefepime, Vancomycin     8/15/2023 ICU Admitted for management of Sepsis, suspected DIC, Acute respiratory failure, Anion-gap metabolic acidosis    7/87/3469 hospital course:  - Central line placed  - Tx: Meropenem was added to Azithromycin, Cefepime, Vancomycin     Today, 8/16/2023, seen and examined at bedside  He was unresponsive; sedated, intubated and on vent support    Past Medical History:    No past medical history on file. Past Surgical History:    No past surgical history on file.     Current Medications:     azithromycin  500 mg IntraVENous Daily    sodium chloride flush  5-40 mL IntraVENous 2 times per day    vancomycin (VANCOCIN) intermittent dosing (placeholder)   Other RX Placeholder    hydrocortisone sodium succinate PF  100 mg IntraVENous Q8H    meropenem  1,000 mg IntraVENous Q8H

## 2023-08-16 NOTE — PROGRESS NOTES
Clinical Pharmacy Progress Note    vancomycin - Management by Pharmacy    Consult Date(s): 8/15/2023  Consulting Provider(s): Dr.Jose Reg Jimenez    Assessment / Plan  Sepsis unknown etiology - Vancomycin  Concurrent Antimicrobials: azithromycin, meropenem  Day of Vanc Therapy / Ordered Duration: 1  Current Dosing Method: Intermittent Dosing by Levels  Therapeutic Goal: Trough ~15 mg/L  Current Dose / Plan:   TBD based on intermittent levels  Will continue to monitor clinical condition and make adjustments to regimen as appropriate. Addendum @ 16:00 -   Level today = 21 mg/L  Will order vancomycin 750mg IV x1 for now. Plan to obtain a random level 8/17 AM, and reassess. John Yeboah PharmD., [facility]   8/16/2023 4:09 PM  Wireless: [phone]    Thank you for consulting pharmacy,    Terry Eng [facility], PharmD, [id number] [facility]  8/16/2023 6:34 AM       Interval update:      Subjective/Objective:   Gregory Silva is a 30 y.o. male with a PMHx significant for sarcoidosis, AIDP who presented to the ED for dyspnea and unresponsiveness     Pharmacy is consulted to vancomycin dosing. Ht Readings from Last 1 Encounters:   08/16/23 5' 4.02\" (1.626 m)     Wt Readings from Last 1 Encounters:   08/16/23 138 lb 10.7 oz (62.9 kg)     Current & Prior Antimicrobial Regimen(s):  Azithromycin (8/15)  Cefepime (8/15)  Meropenem (8/16-current)  Vancomycin - pharmacy to dose  Intermittent via levels (8/15-current)    Date Vanc Level: Vanc Dose:   8/15  1500mg   8/16 21 mg/L Ordered 750mg   8/17 Ordered            Vancomycin Level(s) / Doses:    Date Time Dose Type of Level / Level Interpretation                 Note: Serum levels collected for AUC-based dosing may be high if collected in close proximity to the dose administered. This is not necessarily indicative of toxicity.     Cultures & Sensitivities:    Date Site Micro Susceptibility / Result   8/15 Blood x2 In process    8/15 Rapid flu Negative    8/15 COVID-19 Negative    8/16 Resp -

## 2023-08-16 NOTE — PROGRESS NOTES
V2.0    Patient:  Gregorio Simmons 30 y.o. male MRN: [id number]     Date of Service: 8/16/2023    Disposition :  Critically ill     Assessment and plan :    Patient with history of sarcoidosis, AIDP, immunosuppressants in the past presented to the hospital with worsening breathing, cough, found to be apneic, intubated and sedated, had low blood pressure, found to be in septic shock the patient was started on vasopressors and admitted in ICU. Sepsis and septic shock.  -Likely secondary to large bilateral big cavitary tree lesion/pneumonia.  -On presentation had leukocytosis, lactic acidosis. -Blood cultures pending, respiratory viral panel negative COVID sputum Gram stain pending, antigens pending, patient had negative TB test in the past, BX pending. -ID consulted. -Continue with vancomycin meropenem, azithromycin.  -Continue with pressor support.  -Patient on Levophed and phenylephrine, max dose, vasopressin added. Started on stress dose steroids by critical care team.  -Appreciate assistance. Respiratory failure secondary to above.  -On vent, propofol for sedation. Metabolic acidosis present on admission secondary to lactic acidosis from sepsis. Concern for DIC with creatinine for him, has anemia, thrombocytopenia, elevated D-dimers and fibrinogen.  -No bleed.  -Heme-onc consulted. Acute kidney injury.  -Likely prerenal from septic shock. History of AIDP. -Already on stress dose steroids and intubated for vent/predatory support. Diet Diet NPO   DVT Prophylaxis [] Lovenox, []  Heparin, [x] SCDs, [] Ambulation,  [] Eliquis, [] Xarelto   Code Status Full Code   Disposition From: Home  Expected Disposition: Home/ ECF to be decided  Estimated Date of Discharge: TBD     Surrogate Decision Maker/ POA       Critically ill, needing frequent monitoring, multiple meds including multiple pressors, propofol and antibiotics need monitoring by drug levels and labs.       Barrier for Discharge: Loculated appearing left pleural effusion identified at the inferior and lateral aspect of the left hemithorax. No sizable right pleural effusion is seen. The airways are patent. No evidence of peribronchial thickening. No evidence of bronchiectasis. Normal density of osseous structures. No evidence of compression deformity. No acute fracture. No osteolytic process. 1.  Large irregular cavitary foci are identified in right upper lobe, right lower lobe and left lower lobe with multiple smaller additional cavitary foci and parenchymal airspace densities identified bilaterally. Correlate for cavitary pneumonia favored over cavitary neoplastic process. 2.  Right paratracheal enlarged likely necrotic lymph node. 3.  Loculated left pleural effusion. CT ABDOMEN: LIVER: The liver size appears normal. Normal hepatic attenuation visualized diffusely. No evidence of focal hepatic abnormality. The portal vein is patent. GALLBLADDER AND BILIARY: Gallbladder is normal in size. No CT evidence of cholelithiasis. No evidence of gallbladder wall thickening. No evidence of associated inflammatory change. No pathologic intra or extrahepatic biliary distention. PANCREAS: Normal size of the pancreas visualized. Normal attenuation. No focal abnormality. SPLEEN: Normal splenic size identified. Normal density visualized. No focal abnormality appreciated. ADRENAL GLANDS: Right adrenal normal size and density. Left adrenal normal size and density. RENAL: Right kidney appears normal in size. Normal cortical enhancement. No evidence of focal abnormality. No evidence of right hydronephrosis. Left kidney appears normal in size. Normal cortical enhancement. No evidence of focal abnormality. No evidence  of left hydronephrosis. VASCULAR: The aorta is normal in diameter. No significant atherosclerotic calcification. Inferior vena cava is normal in diameter. GASTROINTESTINAL: Stomach and duodenum appear normal in configuration.  Small intestinal

## 2023-08-16 NOTE — CARE COORDINATION
Case Management Assessment  Initial Evaluation    Date/Time of Evaluation: 8/16/2023 8:48 AM  Assessment Completed by: Delmi Beth RN    If patient is discharged prior to next notation, then this note serves as note for discharge by case management. Patient Name: Chuyita Iqbal                   YOB: 1985  Diagnosis: Respiratory acidosis [E87.29]  Cavitary lesion of lung [J98.4]  Septic shock ([address]) [A41.9, R65.21]  Acute respiratory failure with hypoxia and hypercapnia ([address]) [J96.01, J96.02]  Anemia, unspecified type [D64.9]  Multifocal pneumonia [J18.9]  Sepsis with acute hypoxic respiratory failure and septic shock, due to unspecified organism ([address]) [A41.9, R65.21, J96.01]                   Date / Time: 8/15/2023  9:33 PM    Patient Admission Status: Inpatient   Readmission Risk (Low < 19, Mod (19-27), High > 27): Readmission Risk Score: 11.9    Current PCP: Pcp No    History Provided by: Medical Record  Patient Orientation: Unable to Assess (SABRA-intubated and sedated)    Patient Cognition: Other (see comment) (unable to assess. Alert and oriented x 4 at baseline)    Advance Directives:      Code Status: Full Code   Patient's Primary Decision Maker is: Legal Next of Kin    Discharge Planning:    Patient lives with: Family Members Type of Home: House  Primary Care Giver: Self  Patient Support Systems include: Family Members   Current Financial resources: Medicaid ([facility])  Current community resources: None  Current services prior to admission: Durable Medical Equipment            Current DME: Wheelchair            Type of Home Care services:  PT, OT    ADLS  Prior functional level: Assistance with the following:, Mobility  Current functional level:  Other (see comment) (TBD pending ptot eval's)    PT AM-PAC:   /24  OT AM-PAC:   /24    Family can provide assistance at DC: No  Would you like Case Management to discuss the discharge plan with any other family members/significant others, and

## 2023-08-16 NOTE — CONSULTS
ICU [address] [facility] Day:   ICU Day:                                                          Code:Full Code  Admit Date: 8/15/2023  PCP: Pcp No                                  CC: dyspnea     HISTORY OF PRESENT ILLNESS:   Patient is a 30 y.o. w/ PMH of sarcoidosis, AIDP who presented to the ED for dyspnea and unresponsiveness. Per mother, patient has a usual cough due to his smoking history, but she noticed that he was doing it more often for about the past week. Also noticed progressive shortness of breath. He did not have more alcohol than usual. He is usually able to ambulate, but does have weakness of his lower extremities and sometimes requires use of a wheelchair. EMS was called after today mom said she noticed he had difficulty breathing. She was giving him duoneb treatments a couple times today to see if that would help, which it did not. He was found to be on his mother's wheelchair and soon after became unresponsive and stopped breathing. He was started on compressions after no pulse was found. They did get one again. IO was placed on left tibia and under assisted ventilatory support with BVM ventilation. When he arrived to the ED, he was continuing to be in respiratory distress and then became apneic. Found to have agonal respirators, so was intubated for airway protection. He was started on IV fluids given his presenting hypotension and tachycardia. In terms of labs, significant for WBCs of 23, lactage of 14. VBG did show pH of 6.948, CO2 73.5. CBC showed Hgb of 7.6, MCV of 113.4 and platelets of 60. CXR showed cavitary mass like areas of consolidation - cavitary pneumonia versus neoplasm with recommendations of CT scan of chest. CT head did not show brain bleed. When we evaluated the patient, he was intubated and just given Versed. He was not responsive to commands.      PMH includes sarcoidosis but no available biopsy results, patient currently not on any medication    MEDICATIONS:

## 2023-08-16 NOTE — CONSULTS
[facility]    Consult Note      Requesting Physician: Patsy Jha     CHIEF COMPLAINT:  Dyspnea      HISTORY OF PRESENT ILLNESS:    Mr. Luda Chen  is a 30 y.o. male we are seeing in consultation for possible DIC. He has a PMH which includes sarcoidosis, GB,  AIDP. He is followed by Dr. Mendy Drake at [facility] and has received Rituxan for his Sarcoidosis, most recently 3/2022. Mother noted patient was progressively becoming SOB with a worsening cough. When EMS arrived, he was noted to be in his mother's WC, which he uses sometimes due to weakness of his BLE. He quickly became unresponsive and pulseless. Pulse returned with compressions but he remained apneic in the ED and was intubated. He was started on IV fluids given his presenting hypotension and tachycardia. In terms of labs, significant for WBCs of 23, lactage of 14. VBG did show pH of 6.948, CO2 73.5. CBC showed Hgb of 7.6, MCV of 113.4 and platelets of 60. CXR showed cavitary mass like areas of consolidation - cavitary pneumonia versus neoplasm with recommendations of CT scan of chest. CT head did not show brain bleed. Admitted to the ICU for septic shock, pressor support, and ventilation. Past Medical History:  No past medical history on file. Past Surgical History:  No past surgical history on file.     Current Medications:  Current Facility-Administered Medications   Medication Dose Route Frequency Provider Last Rate Last Admin    azithromycin (ZITHROMAX) 500 mg in sodium chloride 0.9 % 250 mL IVPB  500 mg IntraVENous Daily Jose Ivey MD        sodium chloride flush 0.9 % injection 5-40 mL  5-40 mL IntraVENous 2 times per day Jose Ivey MD   10 mL at 08/16/23 0907    sodium chloride flush 0.9 % injection 5-40 mL  5-40 mL IntraVENous PRN Jose Ivey MD        0.9 % sodium chloride infusion   IntraVENous of pathologic colonic wall thickening. No evidence of colon mass. The appendix appears normal.    PERITONEUM: Normal fat attenuation. No evidence of pneumoperitoneum. No evidence of ascites. RETROPERITONEUM: No evidence of retroperitoneal mass or lymphadenopathy. MUSCULOSKELETAL: No evidence of abdominal hernia. No evidence of osteolytic process. Normal osseous density and configuration visualized. CT PELVIS:    INTESTINAL: Small intestinal loops within the pelvis appear normal in diameter. The distal colon is unremarkable. The rectum appears normal.    URINARY TRACT: No evidence of distal ureteral abnormality. Urinary catheter present in satisfactory location. Decompressed urinary bladder visualized. REPRODUCTIVE: The prostate is normal in size and attenuation. PELVIC CAVITY: No pelvic mass visualized. No evidence of free pelvic fluid. LYMPHATIC: No evidence of pelvic lymphadenopathy. INGUINAL REGION: No evidence of inguinal hernia. No evidence of inguinal lymphadenopathy. IMPRESSION:    No CT evidence of acute abdominal or pelvic pathologic process. Electronically signed by MD Niall Hylton      Problem List  Patient Active Problem List   Diagnosis    Septic shock [facility])       IMPRESSION/RECOMMENDATIONS:    DIC   -Noted to have worsening anemia, thrombocytopenia, elevated coags, elevated D-dimer & fibrinogen- Likely secondary to sepsis   - his most recent Hgb was noted to be 8.8 11/2022  -Dr. Leonardo Watts reviewed Peripheral Smear   - iron studies, B12, folate, LDH, haptoglobin pending   - check coags QAM  - give 10 mg IV Vitamin K   - transfuse for Hgb <7, platelets <48  - consider FFP  if fibrinogen <100     Bilateral cavitary pneumonia  Leukocytosis, lactic acidosis, imaging showing BL cavitary lesion. Meets criteria for sepsis. Pressors and resp support required.    - Blood cultures, acid-fast, resp culture, resp viral panel, sputum gram stain, strep pneum antigen, urine legionella antigen,

## 2023-08-16 NOTE — FLOWSHEET NOTE
Noted at this time that patient having bloody/brown drainage from OG tube. 300 mL present in cannister. ICU residents notified. Patient has Vitamin K ordered IV. No other apparent oozing of blood from around tubes.

## 2023-08-16 NOTE — CONSULTS
Clinical Pharmacy Progress Note    Vancomycin - Management by Pharmacy    Consult Date: 8/15/23  Consulting Provider: Elmo Arboleda    A vancomycin loading dose of 1500 mg x1 (~ 25 mg/kg) has been ordered for the patient. If vancomycin is desired to continue on admission, a new consult must be placed for pharmacy to continue dosing.      Thank you for consulting pharmacy,    Manjinder AdamesD, [facility] 8/15/2023 10:32 PM

## 2023-08-16 NOTE — CONSULTS
Clinical Pharmacy Progress Note  Medication History     Admit Date: 8/15/2023    Pharmacy consulted to verify home medication list by Dr Monica Conn. List of of current medications patient is taking is complete. Home Medication list in EPIC updated to reflect changes noted below. Source of information: spoke on phone with mother, Graciela Garza [phone], [facility] report    Patient's home pharmacy: [address] [place] [address] made to medication list:   Medications added:   Gabapentin - per mother, pt takes Two 300mg QHS  Tylenol-PM OTC per mother    Current Outpatient Medications   Medication Instructions    diphenhydrAMINE-APAP, sleep, (TYLENOL PM EXTRA STRENGTH)  MG tablet 1-2 tablets, Oral, NIGHTLY PRN    gabapentin (NEURONTIN) 600 mg, Oral, EVERY BEDTIME       Please call with any questions.   Ramu DunbarD., [facility]   8/16/2023 3:58 PM  Wireless: [phone]

## 2023-08-16 NOTE — PROGRESS NOTES
Pt admitted to room 4505, intubated and on 16 mcg/kg/min norepinephrine. His pupils are 1-2 mm and sluggish to react to light. He has very delayed responses to painful stimuli in all extremities. CHG bath completed. Temperature was 35.6 degrees Celsius so Dalia hugger was applied at the time.

## 2023-08-16 NOTE — ED PROVIDER NOTES
Intubation    Date/Time: 8/15/2023 10:05 PM  Performed by: RASHAWN Hill NP  Authorized by: Scar Mosquear MD     Consent:     Consent obtained:  Emergent situation  Pre-procedure details:     Indication: failure to oxygenate, failure to protect airway, failure to ventilate and predicted clinical deterioration      Patient status:  Unresponsive    Look externally: no concerns      Mouth opening - incisor distance:  Unable to open    Mallampati score:  I    Neck mobility: normal      Pharmacologic strategy: RSI      Induction agents:  Etomidate    Paralytics:  Succinylcholine  Procedure details:     Preoxygenation:  Bag valve mask    CPR in progress: no      Intubation method:  Oral    Intubation technique: video assisted      Laryngoscope blade:   Mac 3    Bougie used: no      Grade view: II      Tube size (mm):  7.5    Tube type:  Cuffed    Number of attempts:  1    Ventilation between attempts: no      Tube visualized through cords: yes    Placement assessment:     ETT at teeth/gumline (cm):  26    Tube secured with:  ETT cummings    Breath sounds:  Equal and absent over the epigastrium    Placement verification: chest rise, colorimetric ETCO2, CXR verification, direct visualization, equal breath sounds and waveform ETCO2      CXR findings:  Appropriate position  Post-procedure details:     Procedure completion:  RASHAWN Amaro NP  08/15/23 [id number]

## 2023-08-16 NOTE — ED NOTES
Report given to ICU Staff IRVIN Rutledge, will pick patient up.       Carley Christian RN  08/15/23 2268

## 2023-08-16 NOTE — ED PROVIDER NOTES
5.1 mmol/L    Chloride 90 (L) 99 - 110 mmol/L    CO2 12 (LL) 21 - 32 mmol/L    Anion Gap 23 (H) 3 - 16    Glucose 153 (H) 70 - 99 mg/dL    BUN 24 (H) 7 - 20 mg/dL    Creatinine 1.2 0.9 - 1.3 mg/dL    Est, Glom Filt Rate >60 >60    Calcium 9.0 8.3 - 10.6 mg/dL   Hepatic Function Panel   Result Value Ref Range    Total Protein 5.8 (L) 6.4 - 8.2 g/dL    Albumin 1.3 (L) 3.4 - 5.0 g/dL    Alkaline Phosphatase 64 40 - 129 U/L    ALT 21 10 - 40 U/L    AST 70 (H) 15 - 37 U/L    Total Bilirubin 0.6 0.0 - 1.0 mg/dL    Bilirubin, Direct <0.2 0.0 - 0.3 mg/dL    Bilirubin, Indirect see below 0.0 - 1.0 mg/dL   Lipase   Result Value Ref Range    Lipase 29.0 13.0 - 60.0 U/L   Lactate, Sepsis   Result Value Ref Range    Lactic Acid, Sepsis 14.5 (HH) 0.4 - 1.9 mmol/L   Blood Gas, Venous   Result Value Ref Range    pH, Hans 6.948 (LL) 7.350 - 7.450    pCO2, Hans 73.5 (H) 41.0 - 51.0 mmHg    pO2, Hans 71.7 (H) 25.0 - 40.0 mmHg    HCO3, Venous 16.1 (L) 24.0 - 28.0 mmol/L    Base Excess, Hans -15.4 (L) -2.0 - 3.0 mmol/L    O2 Sat, Hans 76 Not established %    Carboxyhemoglobin 1.3 0.0 - 1.5 %    MetHgb, Hans 0.3 0.0 - 1.5 %    TC02 (Calc), Hans 18 mmol/L    Hemoglobin, Hans, Reduced 23.90 %   Troponin   Result Value Ref Range    Troponin, High Sensitivity 65 (H) 0 - 22 ng/L   BNP   Result Value Ref Range    Pro-BNP 3,399 (H) 0 - 124 pg/mL   Protime-INR   Result Value Ref Range    Protime 22.3 (H) 11.5 - 14.8 sec    INR 1.97 (H) 0.84 - 1.16   Urinalysis with Reflex to Culture    Specimen: Urine   Result Value Ref Range    Color, UA Yellow Straw/Yellow    Clarity, UA Clear Clear    Glucose, Ur Negative Negative mg/dL    Bilirubin Urine MODERATE (A) Negative    Ketones, Urine Negative Negative mg/dL    Specific Gravity, UA 1.025 1.005 - 1.030    Blood, Urine Negative Negative    pH, UA 6.0 5.0 - 8.0    Protein, UA TRACE (A) Negative mg/dL    Urobilinogen, Urine 1.0 <2.0 E.U./dL    Nitrite, Urine Negative Negative    Leukocyte Esterase, Urine Negative Negative    Microscopic Examination YES     Urine Type NotGiven     Urine Reflex to Culture Not Indicated    ETOH   Result Value Ref Range    Ethanol Lvl None Detected mg/dL   Microscopic Urinalysis   Result Value Ref Range    WBC, UA 0-2 0 - 5 /HPF    RBC, UA 0-2 0 - 4 /HPF    Epithelial Cells, UA 2-5 0 - 5 /HPF    Bacteria, UA Rare (A) None Seen /HPF   POCT Glucose   Result Value Ref Range    POC Glucose 56 (L) 70 - 99 mg/dl    Performed on ACCU-CHEK    POCT Glucose   Result Value Ref Range    POC Glucose 254 (H) 70 - 99 mg/dl    Performed on ACCU-CHEK      EKG   Sinus tachycardia with no acute ischemic changes    MOST RECENT VITALS:  BP: 103/77,  , Pulse: (!) 118, Respirations: 20, SpO2: (!) 84 %     Procedures     N/A    ED Course          The patient was given the following medications:  Orders Placed This Encounter   Medications    succinylcholine (ANECTINE) 20 MG/ML injection     Randall Armstrong: cabinet override    etomidate (AMIDATE) 2 MG/ML injection     Yudi Galas: cabinet override    dextrose 10 % infusion     Drenda Keely: cabinet override    naloxone Norm Mis) 2 MG/2ML injection     Drenda Keely: cabinet override    lactated ringers bolus bolus 1,782 mL    etomidate (AMIDATE) injection    succinylcholine (ANECTINE) injection    ceFEPIme (MAXIPIME) 2,000 mg in sodium chloride 0.9 % 50 mL IVPB (mini-bag)     Order Specific Question:   Antimicrobial Indications     Answer:   Sepsis of Unknown Etiology     Order Specific Question:   Sepsis duration of therapy     Answer:    Other    azithromycin (ZITHROMAX) 500 mg in sodium chloride 0.9 % 250 mL IVPB     Order Specific Question:   Antimicrobial Indications     Answer:   Pneumonia (CAP)    vancomycin (VANCOCIN) 1,500 mg in sodium chloride 0.9 % 250 mL IVPB     Order Specific Question:   Antimicrobial Indications     Answer:   Sepsis of Unknown Etiology     Order Specific Question:   Sepsis duration of therapy     Answer:

## 2023-08-16 NOTE — H&P
SCDs  DISPO: To ICU    Preston Pink MD, PGY-1  Internal Medicine Resident  Contact via [facility]  08/16/23  3:28 AM    This patient has been staffed and discussed with Dr. Al Barron MD.

## 2023-08-17 PROBLEM — J18.9 MULTIFOCAL PNEUMONIA: Status: ACTIVE | Noted: 2023-01-01

## 2023-08-17 NOTE — PLAN OF CARE
Problem: Discharge Planning  Goal: Discharge to home or other facility with appropriate resources  8/17/2023 0205 by Tariq Byers RN  Outcome: Progressing  8/16/2023 2007 by Ramón Thompson RN  Outcome: Progressing  Flowsheets (Taken 8/16/2023 0800)  Discharge to home or other facility with appropriate resources:   Identify barriers to discharge with patient and caregiver   Arrange for needed discharge resources and transportation as appropriate   Identify discharge learning needs (meds, wound care, etc)   Refer to discharge planning if patient needs post-hospital services based on physician order or complex needs related to functional status, cognitive ability or social support system     Problem: Pain  Goal: Verbalizes/displays adequate comfort level or baseline comfort level  8/17/2023 0205 by Tariq Byers RN  Outcome: Progressing  8/16/2023 2007 by Ramón Thompson RN  Outcome: Progressing  Flowsheets (Taken 8/16/2023 0800)  Verbalizes/displays adequate comfort level or baseline comfort level:   Encourage patient to monitor pain and request assistance   Assess pain using appropriate pain scale   Administer analgesics based on type and severity of pain and evaluate response   Implement non-pharmacological measures as appropriate and evaluate response   Consider cultural and social influences on pain and pain management   Notify Licensed Independent Practitioner if interventions unsuccessful or patient reports new pain     Problem: Neurosensory - Adult  Goal: Achieves stable or improved neurological status  8/17/2023 0205 by Tariq Byers RN  Outcome: Progressing  8/16/2023 2007 by Ramón Thompson RN  Outcome: Progressing     Problem: Cardiovascular - Adult  Goal: Maintains optimal cardiac output and hemodynamic stability  8/17/2023 0205 by Tariq Byers RN  Outcome: Progressing  8/16/2023 2007 by Ramón Thompson RN  Outcome: Progressing  Flowsheets (Taken 8/16/2023 2000 by Natalia Aguilar Zakia Barfield RN  Outcome: Progressing  8/16/2023 2007 by Lilian Drake RN  Outcome: Progressing  Flowsheets  Taken 8/16/2023 2000 by Agapito Tavares RN  Oral Mucous Membranes Remain Intact: Assess oral mucosa and hygiene practices  Taken 8/16/2023 0800 by Jacqueline Snow RN  Oral Mucous Membranes Remain Intact:   Implement preventative oral hygiene regimen   Assess oral mucosa and hygiene practices   Implement oral medicated treatments as ordered     Problem: Metabolic/Fluid and Electrolytes - Adult  Goal: Electrolytes maintained within normal limits  8/17/2023 0205 by Agapito Tavares RN  Outcome: Progressing  8/16/2023 2007 by Lilian Drake RN  Outcome: Progressing  Goal: Hemodynamic stability and optimal renal function maintained  8/17/2023 0205 by Agapito Tavares RN  Outcome: Progressing  8/16/2023 2007 by Lilian Drake RN  Outcome: Progressing  Goal: Glucose maintained within prescribed range  8/17/2023 0205 by Agapito Tavares RN  Outcome: Progressing  8/16/2023 2007 by Lilian Drake RN  Outcome: Progressing     Problem: Safety - Adult  Goal: Free from fall injury  8/17/2023 0205 by Agapito Tavares RN  Outcome: Progressing  8/16/2023 2007 by Lilian Drake RN  Outcome: Progressing  Flowsheets  Taken 8/16/2023 2000 by Agapito Tavares RN  Free From Fall Injury: Instruct family/caregiver on patient safety  Taken 8/16/2023 0800 by Lilian Drake RN  Free From Fall Injury:   Instruct family/caregiver on patient safety   Based on caregiver fall risk screen, instruct family/caregiver to ask for assistance with transferring infant if caregiver noted to have fall risk factors     Problem: Safety - Medical Restraint  Goal: Remains free of injury from restraints (Restraint for Interference with Medical Device)  Description: INTERVENTIONS:  1. Determine that other, less restrictive measures have been tried or would not be effective before applying the restraint  2.  Evaluate the patient's

## 2023-08-17 NOTE — PLAN OF CARE
Problem: Discharge Planning  Goal: Discharge to home or other facility with appropriate resources  Outcome: Progressing  Flowsheets (Taken 8/16/2023 0800)  Discharge to home or other facility with appropriate resources:   Identify barriers to discharge with patient and caregiver   Arrange for needed discharge resources and transportation as appropriate   Identify discharge learning needs (meds, wound care, etc)   Refer to discharge planning if patient needs post-hospital services based on physician order or complex needs related to functional status, cognitive ability or social support system     Problem: Pain  Goal: Verbalizes/displays adequate comfort level or baseline comfort level  Outcome: Progressing  Flowsheets (Taken 8/16/2023 0800)  Verbalizes/displays adequate comfort level or baseline comfort level:   Encourage patient to monitor pain and request assistance   Assess pain using appropriate pain scale   Administer analgesics based on type and severity of pain and evaluate response   Implement non-pharmacological measures as appropriate and evaluate response   Consider cultural and social influences on pain and pain management   Notify Licensed Independent Practitioner if interventions unsuccessful or patient reports new pain     Problem: Neurosensory - Adult  Goal: Achieves stable or improved neurological status  Outcome: Progressing     Problem: Cardiovascular - Adult  Goal: Maintains optimal cardiac output and hemodynamic stability  Outcome: Progressing  Goal: Absence of cardiac dysrhythmias or at baseline  Outcome: Progressing     Problem: Skin/Tissue Integrity - Adult  Goal: Skin integrity remains intact  Outcome: Progressing  Goal: Incisions, wounds, or drain sites healing without S/S of infection  Outcome: Progressing  Goal: Oral mucous membranes remain intact  Outcome: Progressing     Problem: Metabolic/Fluid and Electrolytes - Adult  Goal: Electrolytes maintained within normal limits  Outcome: patient's condition at the time of restraint application   Inform patient/family regarding the reason for restraint   Every 2 hours: Monitor safety, psychosocial status, comfort, nutrition and hydration  Taken 8/16/2023 0800  Remains free of injury from restraints (restraint for interference with medical device):   Determine that other, less restrictive measures have been tried or would not be effective before applying the restraint   Evaluate the patient's condition at the time of restraint application   Inform patient/family regarding the reason for restraint   Every 2 hours: Monitor safety, psychosocial status, comfort, nutrition and hydration

## 2023-08-17 NOTE — PROGRESS NOTES
Upon assessment, patient noted to have large fluid filled blisters on legs, hips. Deep purple mottling scattered on bilateral ears, trunk, arms,legs, feet, back, buttocks. ICU residents aware. Patient remains unresponsive. Patient's mother to visit later this afternoon.

## 2023-08-17 NOTE — PROGRESS NOTES
Latest Reference Range & Units 08/17/23 01:00   Hemoglobin, Art, Extended g/dL 8.50   pH, Arterial 7.350 - 7.450  7.057 (LL)   pCO2, Arterial 35.0 - 45.0 mmHg 50.8 (H)   pO2, Arterial 75.0 - 108.0 mmHg 60.6 (L)   HCO3, Arterial 21 - 29 mmol/L 14 (L)   TCO2 (calc), Art mmol/L 16   Base Excess, Arterial -3.0 - 3.0 mmol/L -15.3 (L)   O2 Sat, Arterial 93 - 100 % 87 (L)   Methemoglobin, Arterial 0.0 - 1.4 % 0.5   Carboxyhgb, Arterial 0.0 - 1.5 % 1.2

## 2023-08-17 NOTE — PROGRESS NOTES
V2.0    Patient:  Mirian Troncoso 30 y.o. male MRN: [id number]     Date of Service: 8/17/2023    Disposition :  Critically ill, poor prognosis    Assessment and plan :    Patient with history of sarcoidosis, AIDP, immunosuppressants in the past presented to the hospital with worsening breathing, cough, found to be apneic, intubated and sedated, had low blood pressure, found to be in septic shock the patient was started on vasopressors and admitted in ICU. Sepsis and septic shock.  -Likely secondary to large bilateral big cavitary  lesion/pneumonia.  -On presentation had leukocytosis, lactic acidosis. -Blood cultures pending, respiratory viral panel negative COVID sputum Gram stain pending, antigens pending, patient had negative TB test in the past, BX pending.,  Pneumonia panel shows Staph aureus, Pseudomonas, H. influenzae and Moraxella. -ID consulted. -Continue with vancomycin meropenem, azithromycin.  -Continue with pressor support.  -Patient on Levophed and phenylephrine, max dose, vasopressin added. Started on stress dose steroids by critical care team.  Continue with same, remains critically ill  -Appreciate assistance. Respiratory failure secondary to above.  -On vent, propofol for sedation. Metabolic acidosis present on admission secondary to lactic acidosis from sepsis. Continues to have acidosis, worsening lactic acid, on bicarb drip. DIC  -Worsening labs, likely secondary to sepsis. -Did have some coffee-ground output in the NG tube overnight. -S/p 4 units PRBC, 4 unit FFP and 1 unit of platelets, heme-onc following, greatly appreciate their assistance.  -Found to have mottling and purpura. Acute kidney injury.  -Likely prerenal from septic shock. History of AIDP. -Already on stress dose steroids and intubated for vent/respiratory support.         Diet Diet NPO   DVT Prophylaxis [] Lovenox, []  Heparin, [x] SCDs, [] Ambulation,  [] Eliquis, [] Xarelto   Code Status Full

## 2023-08-17 NOTE — CONSULTS
Clinical Pharmacy Consult Note    Pharmacy was consulted by Dr. Giordano Fearing to dose vancomycin for sepsis. We will sign off of the case, as this therapy has since been discontinued. Please consider consulting pharmacy again if vancomycin is re-started. Thank you for allowing us to participate in the care of this patient.     Kermit Salinas  PGY1 Pharmacy Resident  Phone: [phone] [place]: [phone]  8/17/2023 10:15 AM

## 2023-08-17 NOTE — PROGRESS NOTES
Maxed on vaso, caron gtt  Levo titrated down throughout shift  Neuro remains unchanged  Intubated  MTP overnight x4 PRBC, x4 FFP, x1 platelet  BS remain above 70, with D10 restarted

## 2023-08-17 NOTE — PROGRESS NOTES
Comprehensive Nutrition Assessment    RECOMMENDATIONS:  Monitor pressor requirements & GI status for ability to feed  Nutrition Education: Education not appropriate     NUTRITION ASSESSMENT:   Nutritional summary & status: New vent: Pt intubated after acute respiratory failure r/t bilateral cavitary pneumonia. Hypotensive on levo and caron. OGT in place, had coffee ground emesis last night, now w/ brown output. Pt has been increasingly hypoglycemic, requiring D5. Worsening bilateral mottling and purpura. Volume overloaded @ +15L, on D5, LR and bicarb drip. Admission // PMH: Septic shock, respiratory failure, cavity pneumonia, DIC    MALNUTRITION ASSESSMENT  Context of Malnutrition: Acute Illness   Malnutrition Status: At risk for malnutrition (Comment)  Findings of the 6 clinical characteristics of malnutrition (Minimum of 2 out of 6 clinical characteristics is required to make the diagnosis of moderate or severe Protein Calorie Malnutrition based on AND/ASPEN Guidelines):  Energy Intake:  Mild decrease in energy intake (Comment)  Weight Loss:  No significant weight loss     Body Fat Loss:  No significant body fat loss     Muscle Mass Loss:  No significant muscle mass loss    Fluid Accumulation:  Moderate to Severe     NUTRITION DIAGNOSIS   Inadequate oral intake related to impaired respiratory function as evidenced by intubation    Nutrition Monitoring and Evaluation:   Food/Nutrient Intake Outcomes:  Enteral Nutrition Intake/Tolerance  Physical Signs/Symptoms Outcomes:  Biochemical Data, Nutrition Focused Physical Findings, Weight, Skin, GI Status     OBJECTIVE DATA: Significant to nutrition assessment  Nutrition Related Findings: Na 133. Lactic acid 15. Lactade 128. Procal 51.6. . AST 2430. +15L.   Wounds: None  Nutrition Goals: Initiate nutrition support (Pending pressor requirements)     CURRENT NUTRITION THERAPIES  Diet NPO  PO Intake: NPO   PO Supplement Intake:NPO  Additional Sources of

## 2023-08-17 NOTE — CONSULTS
[facility]  Palliative Medicine Consultation Note      Date Of Admission:8/15/2023  Date of consult: 08/17/23  Seen by [facility] in the past:  No    Recommendations:        Upon d/w ICU team, prognosis is grim, being tx for DIC and B/L cavitary PNA. Mother Beatriz Lovell) is a NOK and decision-maker. Discussion have been held prior to palliative consult, Beatriz Lovell adamant about aggressive measures and full code. Beatriz Lovell is wheelchair bound and has not yet seen the patient due to difficulties with travel. She likely is having a difficult time with this especially since patient young. Patient plans to come to hospital later today. Will give mother Beatriz Lovell time alone with patient first and allow her time to process what is going on. Upon d/w ICU team, code status and [address] discussion already held, will avoid overloading mother with continued conversation. Will introduce myself to mother in person, and follow up tomorrow. 1. Goals of Care/Advanced Care planning/Code status: Full Code, NOK is mother Beatriz Lovell. GOC held priorly, mother has not seen pt yet, will need time to absorb situation. Aggressive measures for no. 2. Pain: unable to assess, intubated and sedated  3. SOB: unable to assess  4. Disposition: tbd, prognosis grim    Reason for Consult:         [x]  Goals of Care  [x]  Code Status Discussion/Advanced Care Planning   []  Psychosocial/Family Support  []  Symptom Management  []  Other (Specify)    Requesting Physician: Dr. Mick Paz MD    CHIEF COMPLAINT:  unresponsiveness    History Obtained From:  EMR    History of Present Illness:         Cheryle Quirk is a 30 y.o. male with PMH of Sarcoidosis and AIDP. Presented 8/15 unresponsive. Mother reports pt had progressive SOB. Mother reportedly found pt in her wheelchair, unresponsive and not breathing. EMS called, started compressions and BVM ventilation, ultimately got ROSC. In ED, agonal respirations, required intubation.  Chest imaging showing

## 2023-08-17 NOTE — PLAN OF CARE
Problem: Discharge Planning  Goal: Discharge to home or other facility with appropriate resources  8/17/2023 0917 by Yousuf Maldonado RN  Outcome: Not Progressing  Flowsheets (Taken 8/17/2023 0800)  Discharge to home or other facility with appropriate resources:   Identify barriers to discharge with patient and caregiver   Arrange for needed discharge resources and transportation as appropriate   Identify discharge learning needs (meds, wound care, etc)   Refer to discharge planning if patient needs post-hospital services based on physician order or complex needs related to functional status, cognitive ability or social support system    Problem: Discharge Planning  Goal: Discharge to home or other facility with appropriate resources  8/17/2023 0917 by Yousuf Maldonado RN  Outcome: Not Progressing  Flowsheets (Taken 8/17/2023 0800)  Discharge to home or other facility with appropriate resources:   Identify barriers to discharge with patient and caregiver   Arrange for needed discharge resources and transportation as appropriate   Identify discharge learning needs (meds, wound care, etc)   Refer to discharge planning if patient needs post-hospital services based on physician order or complex needs related to functional status, cognitive ability or social support system     Problem: Neurosensory - Adult  Goal: Achieves stable or improved neurological status  8/17/2023 0917 by Yousuf Maldonado RN  Outcome: Not Progressing  Flowsheets (Taken 8/17/2023 0800)  Achieves stable or improved neurological status:   Assess for and report changes in neurological status   Initiate measures to prevent increased intracranial pressure   Maintain blood pressure and fluid volume within ordered parameters to optimize cerebral perfusion and minimize risk of hemorrhage   Monitor temperature, glucose, and sodium.  Initiate appropriate interventions as ordered     Problem: Skin/Tissue Integrity - Adult  Goal: Skin integrity

## 2023-08-17 NOTE — PROGRESS NOTES
Bruising noted to be worsening throughout shift. ICU residents made aware. Has spread to all 4 extremities, chest, abd, and progressively getting worse.

## 2023-08-17 NOTE — PROGRESS NOTES
Patient's mother and other family members at bedside, updated and all questions answered, emotional support extended. Dr. Hood Chase at bedside to speak with and update the family. Patient's mother very tearful. Decision made to change code status of patient to [facility]. ICU residents notified and order placed.

## 2023-08-18 NOTE — DISCHARGE SUMMARY
Called to examine patient who has . No response to verbal and tactile stimuli. No respiratory effort. Absent heart sounds and pulses. Pupils fixed and dilated. Patient pronounced dead at 11:59 AM hours. Chery Bagley MD   Hospitalist, Internal Medicine                                                                         Death Summary     Patient: Isabella Yang       MRN: [id number]       YOB: 1985       Age: 30 y.o. Date of admission:  8/15/2023    Date of death:  2023    Primary care provider:  Pcp No     Admitting provider:  Jose Quezada MD    Discharging provider:  Chery Bagley MD     Consultations  PHARMACY TO DOSE VANCOMYCIN  IP CONSULT TO [id number] [address] [facility] TO DOSE VANCOMYCIN  IP CONSULT TO INFECTIOUS DISEASES  IP CONSULT TO HEMATOLOGY  IP CONSULT TO PALLIATIVE CARE    Procedures  none    Admission diagnoses  Respiratory acidosis [E87.29]  Cavitary lesion of lung [J98.4]  Septic shock ([address]) [A41.9, R65.21]  Acute respiratory failure with hypoxia and hypercapnia ([address]) [J96.01, J96.02]  Anemia, unspecified type [D64.9]  Multifocal pneumonia [J18.9]  Sepsis with acute hypoxic respiratory failure and septic shock, due to unspecified organism ([address]) [A41.9, R65.21, J96.01]    Please refer to the admission history and physical for details on the presenting problem.      Final discharge diagnoses and brief hospital course  Patient with history of sarcoidosis, AIDP, immunosuppression in the past presented to the hospital with worsening breathing, cough, was found to be apneic intubated and sedated had low blood pressure found to be in septic shock and was started on vasopressors, patient continued to decline with worsening pressor requirements, was started on 3 pressors without significant improvement, stress dose steroids were started as well, was kept on broad-spectrum antibiotics, multiple specialties involved including critical care team, ID, was kept on

## 2023-08-18 NOTE — CARE COORDINATION
Case management is following for discharge planning. The chart was reviewed. Mr. Dejan Fox remains in the ICU intubated and sedated, AED's, IV ABX, and a levo gtt. NTF started. Pt is from home with family members. He is alert and oriented x 4 and independent with self care and functional mobility at baseline. CM will reassess for disposition and service needs when he is more medically stable.     Karla Carlson RN  Case Management  [phone]

## 2023-08-18 NOTE — PLAN OF CARE
Problem: Discharge Planning  Goal: Discharge to home or other facility with appropriate resources  Outcome: Progressing  Flowsheets (Taken 8/17/2023 2000)  Discharge to home or other facility with appropriate resources: Identify barriers to discharge with patient and caregiver     Problem: Pain  Goal: Verbalizes/displays adequate comfort level or baseline comfort level  Outcome: Progressing     Problem: Neurosensory - Adult  Goal: Achieves stable or improved neurological status  Outcome: Progressing  Flowsheets (Taken 8/17/2023 2000)  Achieves stable or improved neurological status: Assess for and report changes in neurological status     Problem: Cardiovascular - Adult  Goal: Maintains optimal cardiac output and hemodynamic stability  Outcome: Progressing  Flowsheets (Taken 8/17/2023 2000)  Maintains optimal cardiac output and hemodynamic stability: Monitor blood pressure and heart rate  Goal: Absence of cardiac dysrhythmias or at baseline  Outcome: Progressing  Flowsheets (Taken 8/17/2023 2000)  Absence of cardiac dysrhythmias or at baseline: Monitor cardiac rate and rhythm     Problem: Skin/Tissue Integrity - Adult  Goal: Skin integrity remains intact  Outcome: Progressing  Flowsheets (Taken 8/17/2023 2000)  Skin Integrity Remains Intact: Assess vascular access sites hourly  Goal: Incisions, wounds, or drain sites healing without S/S of infection  Outcome: Progressing  Flowsheets (Taken 8/17/2023 2000)  Incisions, Wounds, or Drain Sites Healing Without Sign and Symptoms of Infection: ADMISSION and DAILY: Assess and document risk factors for pressure ulcer development  Goal: Oral mucous membranes remain intact  Outcome: Progressing  Flowsheets (Taken 8/17/2023 2000)  Oral Mucous Membranes Remain Intact: Assess oral mucosa and hygiene practices     Problem: Metabolic/Fluid and Electrolytes - Adult  Goal: Electrolytes maintained within normal limits  Outcome: Progressing  Flowsheets (Taken 8/17/2023 2000)  Electrolytes maintained within normal limits: Monitor labs and assess patient for signs and symptoms of electrolyte imbalances  Goal: Hemodynamic stability and optimal renal function maintained  Outcome: Progressing  Flowsheets (Taken 8/17/2023 2000)  Hemodynamic stability and optimal renal function maintained: Monitor labs and assess for signs and symptoms of volume excess or deficit  Goal: Glucose maintained within prescribed range  Outcome: Progressing  Flowsheets (Taken 8/17/2023 2000)  Glucose maintained within prescribed range: Monitor blood glucose as ordered     Problem: Safety - Adult  Goal: Free from fall injury  Outcome: Progressing  Flowsheets (Taken 8/17/2023 2000)  Free From Fall Injury: Instruct family/caregiver on patient safety     Problem: Skin/Tissue Integrity  Goal: Absence of new skin breakdown  Description: 1. Monitor for areas of redness and/or skin breakdown  2. Assess vascular access sites hourly  3. Every 4-6 hours minimum:  Change oxygen saturation probe site  4. Every 4-6 hours:  If on nasal continuous positive airway pressure, respiratory therapy assess nares and determine need for appliance change or resting period.   Outcome: Progressing     Problem: ABCDS Injury Assessment  Goal: Absence of physical injury  Outcome: Progressing  Flowsheets (Taken 8/17/2023 2000)  Absence of Physical Injury: Implement safety measures based on patient assessment     Problem: Nutrition Deficit:  Goal: Optimize nutritional status  Outcome: Progressing

## 2023-08-18 NOTE — PROGRESS NOTES
Palliative Care Chart Review  and Check in Note:     NAME:  Maura Shaffer  Admit Date: 8/15/2023  Hospital Day:  Hospital Day: 4   Current Code status: DNR-CCA    Palliative care is continuing to following Mr. Benoit Oconnell for goals of care discussion as needed. Patient's chart reviewed today 8/18/23. Nurse contacted palliative care to inform of clinical deterioration. Lacy MOROCHO contacted NOK/mother Fani Zepeda who is understandably tearful and reports they were planning to visit at [address] [place] [address], but will come sooner given unfortunate worsening. Will await family and discuss with them regarding imminent death after they have a few minutes alone with patient. Addendum:  Pt passed shortly after. Mother in family room, additional family members on the way.     Carlos Eduardo Parekh MD, PGY-2  08/18/23  9:40 AM

## 2023-08-18 NOTE — PROGRESS NOTES
This RN noted pt moving head side to side with seizure like activity. ICU resident notified, 1mg ativan ordered at this time.

## 2023-08-18 NOTE — DISCHARGE SUMMARY
INTERNAL MEDICINE DEPARTMENT AT [address] [place] [address]  DISCHARGE SUMMARY    Patient ID: Isis Fowler                                             Discharge Date: 8/18/2023   Patient's PCP: Pcp No                                          Discharge Physician: James Rubi MD MD  Admit Date: 8/15/2023   Admitting Physician: Yves Griggs MD    PROBLEMS DURING HOSPITALIZATION:  Present on Admission:   Septic shock ([address] )   Acute respiratory failure with hypoxia ([address])   Cavitary pneumonia   Lactic acidosis   Multifocal pneumonia    DISCHARGE DIAGNOSES:    HPI:  Patient is a 30 y.o. w/ PMH of sarcoidosis, AIDP who presented to the ED for dyspnea and unresponsiveness. Per mother, patient has a usual cough due to his smoking history, but she noticed that he was doing it more often for about the past week. Also noticed progressive shortness of breath. He did not have more alcohol than usual. He is usually able to ambulate, but does have weakness of his lower extremities and sometimes requires use of a wheelchair. EMS was called after today mom said she noticed he had difficulty breathing. She was giving him duoneb treatments a couple times today to see if that would help, which it did not. He was found to be on his mother's wheelchair and soon after became unresponsive and stopped breathing. He was started on compressions after no pulse was found. They did get one again. IO was placed on left tibia and under assisted ventilatory support with BVM ventilation. When he arrived to the ED, he was continuing to be in respiratory distress and then became apneic. Found to have agonal respirators, so was intubated for airway protection. He was started on IV fluids given his presenting hypotension and tachycardia. In terms of labs, significant for WBCs of 23, lactage of 14. VBG did show pH of 6.948, CO2 73.5. CBC showed Hgb of 7.6, MCV of 113.4 and platelets of 60.  CXR showed cavitary mass like areas of consolidation -

## 2023-08-18 NOTE — DEATH NOTES
Death Pronouncement Note  Patient's Name: Cheryle Quirk   Patient's YOB: 1985  MRN Number: [id number]    Admitting Provider: Dave Humphrey MD  Attending Provider: Mick Paz MD    Patient was examined and the following were absent: Pulses, Blood Pressure, and Respiratory effort    I declared the patient dead on 8/18/2023 at 9:55 AM    Preliminary Cause of Death: Shock circulatory [facility])     Electronically signed by Sarah Summers MD on 8/18/23 at 10:08 AM EDT

## 2023-08-18 NOTE — PROGRESS NOTES
Patient's ABG and labs given to Dr. Roshan Montemayor. Patient is maxed on pressors. Family being called by palliative care. On their way to bedside.

## 2023-08-19 LAB
BACTERIA BLD CULT ORG #2: NORMAL
BACTERIA BLD CULT: ABNORMAL
BACTERIA BLD CULT: ABNORMAL
ORGANISM: ABNORMAL
ORGANISM: ABNORMAL
PRELIMINARY: NORMAL

## 2023-08-20 LAB
BACTERIA SPEC RESP CULT: ABNORMAL
GRAM STN SPEC: ABNORMAL
ORGANISM: ABNORMAL
ORGANISM: ABNORMAL

## 2023-08-21 LAB — PRELIMINARY: NORMAL

## 2023-08-23 LAB — EMERGENCY ISSUE BLOOD PRODUCTS SIGNED FORM: NORMAL

## 2023-08-28 LAB
FINAL REPORT: NORMAL
PRELIMINARY: NORMAL

## 2023-08-29 LAB
ACID FAST STN SPEC QL: NORMAL
MYCOBACTERIUM SPEC CULT: NORMAL

## 2023-09-05 LAB
ACID FAST STN SPEC QL: NORMAL
MYCOBACTERIUM SPEC CULT: NORMAL

## 2023-09-12 LAB
ACID FAST STN SPEC QL: NORMAL
MYCOBACTERIUM SPEC CULT: NORMAL

## 2023-09-19 LAB
ACID FAST STN SPEC QL: NORMAL
MYCOBACTERIUM SPEC CULT: NORMAL

## 2023-09-26 LAB
ACID FAST STN SPEC QL: NORMAL
MYCOBACTERIUM SPEC CULT: NORMAL

## 2023-10-03 LAB
ACID FAST STN SPEC QL: NORMAL
MYCOBACTERIUM SPEC CULT: NORMAL

## 2023-12-17 LAB — PATH INTERP BLD-IMP: NORMAL
